# Patient Record
Sex: MALE | Race: BLACK OR AFRICAN AMERICAN | NOT HISPANIC OR LATINO | URBAN - METROPOLITAN AREA
[De-identification: names, ages, dates, MRNs, and addresses within clinical notes are randomized per-mention and may not be internally consistent; named-entity substitution may affect disease eponyms.]

---

## 2021-03-20 ENCOUNTER — EMERGENCY (EMERGENCY)
Facility: HOSPITAL | Age: 26
LOS: 1 days | Discharge: HOME | End: 2021-03-20
Attending: INTERNAL MEDICINE | Admitting: INTERNAL MEDICINE
Payer: COMMERCIAL

## 2021-03-20 VITALS
RESPIRATION RATE: 100 BRPM | TEMPERATURE: 98 F | WEIGHT: 184.97 LBS | HEART RATE: 65 BPM | OXYGEN SATURATION: 100 % | SYSTOLIC BLOOD PRESSURE: 117 MMHG | DIASTOLIC BLOOD PRESSURE: 71 MMHG | HEIGHT: 74 IN

## 2021-03-20 PROCEDURE — 99282 EMERGENCY DEPT VISIT SF MDM: CPT

## 2021-03-20 NOTE — ED PROVIDER NOTE - NSFOLLOWUPINSTRUCTIONS_ED_ALL_ED_FT
Rotator Cuff Injury    AMBULATORY CARE:    A rotator cuff injury is damage to the muscles or tendons of your rotator cuff. The rotator cuff is a group of muscles and tendons that hold the shoulder joint in place. The damage may include muscle stretching, tendon tears, or bursa inflammation. The bursa is a fluid sac around the joint.     Shoulder Anatomy         Common signs and symptoms:   •Pain that may be constant or come and go (such as only when you lie on the injured shoulder)      •Pain or stiffness in your shoulder that travels down your arm      •Trouble lifting your arm or placing it behind your back      •Trouble moving or using your shoulder      •A swollen shoulder that may be painful to the touch      •Numbness in part or all of your arm      •A popping noise along with pain when you lift your arm      Call your doctor or orthopedist if:   •You suddenly cannot move your arm.      •The pain in your shoulder or arm is not improving, or is worse than before you started treatment.      •You have new pain in your neck.      •You have questions or concerns about your condition or care.      Treatment may include any of the following:   •Medicines may be needed for pain or inflammation.      •A physical therapist can teach you exercises to help improve shoulder movement and strength, and decrease pain. You may learn changes to daily activities that will help decrease stress on your tendons.      •Surgery may be needed if your injury is severe or your symptoms do not improve. Surgery may be used to clean away damaged tissue or fix a tear. A piece of another tissue or muscle may be used to fix a badly torn tendon. The bone of your shoulder joint may be reshaped so it stays in place. An artificial joint made of metal and plastic may be put into your shoulder.      Care for your rotator cuff injury at home:   •Rest may help your shoulder heal. Overuse of your shoulder can make your injury worse. Avoid heavy lifting, putting your arms over your head, or sports that need an overhead or throwing motion. Any of these movements can cause or worsen a rotator cuff injury.      •Put ice on your shoulder every few hours for the first several days. Ice helps decrease pain and swelling. Use an ice pack, or put crushed ice in a plastic bag. Wrap a towel around the bag before you put it on your shoulder. Apply ice for 15 minutes every hour, or as directed.      •Put heat on your shoulder when directed. After the first several days, heat may help relax the muscles in your shoulder. Use a heat pack or heating pad. Apply heat for 20 minutes every hour, or as directed.      Follow up with your doctor or orthopedist as directed: Write down your questions so you remember to ask them during your visits.

## 2021-03-20 NOTE — ED PROVIDER NOTE - CHIEF COMPLAINT
Routine safety standards initiated.  Routine nursing standards initiated.   The patient is a 25y Male complaining of shoulder pain/injury.

## 2021-03-20 NOTE — ED PROVIDER NOTE - CLINICAL SUMMARY MEDICAL DECISION MAKING FREE TEXT BOX
shoulder injury already seen at  and had x-rays, he is here for a referral and off from work note for 4 days

## 2021-03-20 NOTE — ED PROVIDER NOTE - MUSCULOSKELETAL, MLM
Spine appears normal, range of motion is not limited, able to elevate above head, LUE  joint tenderness

## 2021-03-20 NOTE — ED PROVIDER NOTE - OBJECTIVE STATEMENT
25 yr old male c/o left shoulder pain since 3/12/21. Pt reports injury occurred at work. Was seen at urgent care and told he may have a torn ligament or rotator cuff and needs an outpatient MRI. Pt seeking outpatient MRI.  shoulder pain/injury 25 yr old male c/o left shoulder pain since 3/12/21. Pt reports injury occurred at work. Was seen at urgent care , xrays were negative, and told he may have a torn ligament or rotator cuff and needs an outpatient MRI. Pt seeking outpatient MRI. Also requesting off from work note   shoulder pain/injury

## 2021-03-20 NOTE — ED ADULT NURSE NOTE - NSIMPLEMENTINTERV_GEN_ALL_ED
Implemented All Fall Risk Interventions:  Hazard to call system. Call bell, personal items and telephone within reach. Instruct patient to call for assistance. Room bathroom lighting operational. Non-slip footwear when patient is off stretcher. Physically safe environment: no spills, clutter or unnecessary equipment. Stretcher in lowest position, wheels locked, appropriate side rails in place. Provide visual cue, wrist band, yellow gown, etc. Monitor gait and stability. Monitor for mental status changes and reorient to person, place, and time. Review medications for side effects contributing to fall risk. Reinforce activity limits and safety measures with patient and family.

## 2021-03-20 NOTE — ED ADULT TRIAGE NOTE - CHIEF COMPLAINT QUOTE
25 yr old male c/o left shoulder pain since 3/12/21. Pt reports injury occurred at work. Was seen at urgent care and told he may have a torn ligament or rotator cuff and needs an outpatient MRI. Pt seeking outpatient MRI.

## 2021-03-20 NOTE — ED PROVIDER NOTE - PATIENT PORTAL LINK FT
You can access the FollowMyHealth Patient Portal offered by [facility] by registering at the following website: [email or web address]. By joining [employer]’s FollowMyHealth portal, you will also be able to view your health information using other applications (apps) compatible with our system.

## 2021-03-20 NOTE — ED PROVIDER NOTE - CARE PROVIDER_API CALL
Duane Siddiqui  ORTHOPAEDIC SURGERY  [address] [place] [address]  Phone: [phone]  Fax: [phone]  Follow Up Time: 1-3 Days

## 2021-03-20 NOTE — ED ADULT NURSE NOTE - OBJECTIVE STATEMENT
received pt stable alert oriented x4 no distress sustained injury to shoulder at work last week and c/o of pain pt evaluated awaiting xray

## 2021-05-07 ENCOUNTER — EMERGENCY (EMERGENCY)
Facility: HOSPITAL | Age: 26
LOS: 1 days | Discharge: HOME | End: 2021-05-07
Attending: EMERGENCY MEDICINE | Admitting: EMERGENCY MEDICINE
Payer: COMMERCIAL

## 2021-05-07 VITALS
HEIGHT: 74 IN | DIASTOLIC BLOOD PRESSURE: 69 MMHG | WEIGHT: 195.11 LBS | RESPIRATION RATE: 16 BRPM | HEART RATE: 52 BPM | TEMPERATURE: 98 F | OXYGEN SATURATION: 99 % | SYSTOLIC BLOOD PRESSURE: 108 MMHG

## 2021-05-07 VITALS
TEMPERATURE: 98 F | HEART RATE: 58 BPM | RESPIRATION RATE: 16 BRPM | OXYGEN SATURATION: 100 % | SYSTOLIC BLOOD PRESSURE: 110 MMHG | DIASTOLIC BLOOD PRESSURE: 70 MMHG

## 2021-05-07 PROCEDURE — 99283 EMERGENCY DEPT VISIT LOW MDM: CPT

## 2021-05-07 PROCEDURE — 99284 EMERGENCY DEPT VISIT MOD MDM: CPT

## 2021-05-07 RX ORDER — METHOCARBAMOL 500 MG/1
2 TABLET, FILM COATED ORAL
Qty: 40 | Refills: 0
Start: 2021-05-07 | End: 2021-05-11

## 2021-05-07 RX ORDER — METHOCARBAMOL 500 MG/1
1500 TABLET, FILM COATED ORAL ONCE
Refills: 0 | Status: COMPLETED | OUTPATIENT
Start: 2021-05-07 | End: 2021-05-07

## 2021-05-07 RX ORDER — IBUPROFEN 200 MG
600 TABLET ORAL ONCE
Refills: 0 | Status: COMPLETED | OUTPATIENT
Start: 2021-05-07 | End: 2021-05-07

## 2021-05-07 RX ADMIN — METHOCARBAMOL 1500 MILLIGRAM(S): 500 TABLET, FILM COATED ORAL at 02:09

## 2021-05-07 RX ADMIN — Medication 600 MILLIGRAM(S): at 02:05

## 2021-05-07 NOTE — ED PROVIDER NOTE - PATIENT PORTAL LINK FT
You can access the FollowMyHealth Patient Portal offered by [facility] by registering at the following website: [email or web address]. By joining [employer]’s FollowMyHealth portal, you will also be able to view your health information using other applications (apps) compatible with our system.

## 2021-05-07 NOTE — ED PROVIDER NOTE - OBJECTIVE STATEMENT
26 yo male no pmhx c/o right lower back pain radiating down right leg, mild, no medications taken prior to arrival, sharp, worse with bending.  No fever/chills.  no urinary/bowel changes.

## 2021-05-07 NOTE — ED PROVIDER NOTE - CARE PROVIDER_API CALL
Ross Castillo)  Orthopaedic Surgery Surgery  [address] [place] [address]  Phone: [phone]  Fax: [phone]  Follow Up Time:

## 2021-05-07 NOTE — ED PROVIDER NOTE - PHYSICAL EXAMINATION
Gen: Alert, NAD  Head/eyes: NC/AT, PERR  ENT: airway patent  Neck: supple, no tenderness/meningismus/JVD, Trachea midline  Pulm/lung: Bilateral clear BS, normal resp effort, no wheeze/stridor/retractions  CV/heart: RRR, no M/R/G, +2 dist pulses (radial, pedal DP/PT, popliteal)  GI/Abd: soft, NT/ND, +BS, no guarding/rebound tenderness  Musculoskeletal: no edema/erythema/cyanosis, FROM in all extremities, no C/T/L spine ttp, +ttp right paraspinal lumbar  Skin: no rash, no vesicles, no petechaie, no ecchymosis, no swelling  Neuro: AAOx3, CN 2-12 intact, normal sensation, 5/5 motor strength in all extremities, normal gait, no dysmetria

## 2021-05-07 NOTE — ED PROVIDER NOTE - NSFOLLOWUPINSTRUCTIONS_ED_ALL_ED_FT
1) Follow-up with your Primary Medical Doctor and Dr. Castillo. Call today / next business day for prompt follow-up.  2) Return to Emergency room for any worsening or persistent pain, weakness, fever, or any other concerning symptoms.  3) See attached instruction sheets for additional information, including information regarding signs and symptoms to look out for, reasons to seek immediate care and other important instructions.  4) Follow-up with any specialists as discussed / noted as well.   5) medrol dose pack as prescribed  6) robaxin 1500mg every 6 hours as needed      WHAT YOU NEED TO KNOW:    What is lumbar radiculopathy? Lumbar radiculopathy is a painful condition that happens when a nerve in your lumbar spine (lower back) is pinched or irritated. Nerves control feeling and movement in your body.    What causes lumbar radiculopathy? You may get a pinched nerve in your lumbar spine if you have disc damage. Discs are natural, spongy cushions between your vertebrae (back bones) that allow your spine to move. Your discs may move out of place and pinch the nerve in your spine. Your risk for a pinched nerve and lumbar radiculopathy increases if:   •You smoke.      •You have diabetes, a spinal infection, or a growth in your spine.      •You are overweight.      •You are male.      •You are elderly.      What are the signs and symptoms of lumbar radiculopathy? You may have any of the following:  •Pain that moves from your lower back to your buttocks, groin, and the back of your leg. The pain is often felt below your knee. Your pain may worsen when you cough, sneeze, stand, or sit.       •Numbness, weakness, or tingling in your back or legs.      How is lumbar radiculopathy diagnosed? Your healthcare provider will examine you and ask about your family history of back and leg pain. He may also test you for weakness, numbness, or tingling in your back, buttocks, and legs. Your healthcare provider may ask you to lie on your back and lift your leg to locate your pain. You may have any of the following:   •Blood tests: You may need blood taken to give healthcare providers information about how your body is working. The blood may be taken from your hand, arm, or IV.       •Magnetic resonance imaging (MRI): An MRI machine is used to take a picture of your lower back. Your healthcare provider will use this picture to check for problems and changes in your back bones, nerves, and discs. You will need to lie still during this test. The MRI machine contains a very powerful magnet. Never enter the MRI room with any metal objects. This can cause serious injury. Tell your healthcare provider if you have any metal implants in your body.      •X-ray: An x-ray is a picture of your lower back. A lumbar x-ray may show signs of infection or other problems with your spine.      •An electromyography (EMG) test measures the electrical activity of your muscles at rest and with movement.      •Computed tomography (CT) scan: A special x-ray machine uses a computer to take pictures of your lower back. It may be used to look at your bones, discs, and nerves. You may be given dye in your IV to help improve the pictures. Tell your healthcare provider if you are allergic to shellfish, or have other allergies or medical conditions. People who are allergic to iodine or shellfish (lobster, crab, or shrimp) may be allergic to some dyes.      How is lumbar radiculopathy treated? Treatment of lumbar radiculopathy may reduce pain and swelling, and improve your ability to walk and do your normal activities. Ask your healthcare provider for more information about these and other treatments for lumbar radiculopathy:   •Medicines:?NSAIDs, such as ibuprofen, help decrease swelling, pain, and fever. This medicine is available with or without a doctor's order. NSAIDs can cause stomach bleeding or kidney problems in certain people. If you take blood thinner medicine, always ask your healthcare provider if NSAIDs are safe for you. Always read the medicine label and follow directions.      ?Muscle relaxers help decrease pain and muscle spasms.      ?Opioids: This is a strong medicine given to reduce severe pain. It is also called narcotic pain medicine. Take this medicine exactly as directed by your healthcare provider.      ?Oral steroids: Steroids may be given to reduce swelling and pain.      ?Steroid injections: Healthcare providers may give you steroid medicine through a needle (shot) into your lumbar spine. This may help decrease your nerve pain and swelling. You may need more than 1 injection if your symptoms do not improve after the first treatment.       •Physical therapy: Your healthcare provider may suggest physical therapy. Your physical therapist may teach you certain exercises to improve posture (the way you stand and sit), flexibility, and strength in your lower back. He may also teach you how to remain safely active and avoid further injury. Follow the exercise plan given to you by your physical therapist.      •Transcutaneous electrical nerve stimulation: This treatment, called TENS, stimulates your nerves and may decrease your pain. Wires are attached to pads. The pads are attached to your skin. The wires send a mild current through your nerves.       •Surgery: You may need surgery to relieve your pinched nerve if your condition has not improved within 4 to 6 weeks. You may also need it if you have lumbar radiculopathy more than once.      What are the risks of treatment for lumbar radiculopathy?   •Without treatment, your pain may worsen. The pinched and swollen nerve may lead to problems when you walk or move. In severe cases, you may lose control of your urine or bowel movements. Bedrest can make your symptoms worse.       •Pain medicines can cause vomiting, upset stomach, constipation (large, hard bowel movements that are difficult to pass), or kidney or liver problems. Opioid medicine may be addictive (hard to quit taking once you start). Oral steroids and steroid injections may have side effects, such as facial redness, fluid retention (water weight gain), and mood changes. Steroid injections may be painful and can cause severe headaches, infections, allergic reactions, or nerve damage. Surgery risks include delayed problems with healing, spinal or bladder infection, damage to the spinal cord or other nerves, and ongoing pain. In rare cases, you could become paralyzed (not able to move your arms or legs).      How can I care for myself when I have lumbar radiculopathy?   •Stay active: It is best to be active when you have lumbar radiculopathy. Your healthcare provider may tell you to take walks to ease yourself back into your daily routine. Avoid long periods of bed rest. Bed rest could worsen your symptoms. Do not move in ways that increase your pain. Ask your healthcare provider for more information about the best ways to stay active.       •Use ice or heat packs: Use ice or heat packs on the sore area of your body to decrease the pain and swelling. Put ice in a plastic bag covered with a towel on your low back. Cover heated items with a towel to avoid burns. Use ice and heat as directed.      •Avoid heavy lifting: Your condition may worsen if you lift heavy things. Avoid lifting if possible.      •Maintain a healthy weight: Excess body weight may strain your back. Talk with your healthcare provider about ways to lose excess weight if you are overweight.       When should I contact my healthcare provider?   •Your pain does not improve within 1 to 3 weeks after treatment.       •Your pain and weakness keep you from your normal activities at work, home, or school.       •You lose more than 10 pounds in 6 months without trying.      •You become depressed or sad because of the pain.      •You have questions or concerns about your condition or care.      When should I seek immediate care or call 911?   •You have a fever greater than 100.4°F for longer than 2 days.      •You have new, severe back or leg pain, or your pain spreads to both legs.      •You have any new signs of numbness or weakness, especially in your lower back, legs, arms, or genital area.      •You have new trouble controlling your urine and bowel movements.      •You do not feel like your bladder empties when you urinate.       CARE AGREEMENT:    You have the right to help plan your care. Learn about your health condition and how it may be treated. Discuss treatment options with your healthcare providers to decide what care you want to receive. You always have the right to refuse treatment.

## 2021-05-07 NOTE — ED ADULT NURSE NOTE - CHPI ED NUR SYMPTOMS NEG
no abrasion/no bruising/no deformity/no difficulty bearing weight/no fever/no numbness/no stiffness/no tingling/no weakness

## 2021-09-03 ENCOUNTER — INPATIENT (INPATIENT)
Facility: HOSPITAL | Age: 26
LOS: 4 days | Discharge: HOME | DRG: 813 | End: 2021-09-08
Attending: HOSPITALIST | Admitting: GENERAL PRACTICE
Payer: COMMERCIAL

## 2021-09-03 VITALS
HEART RATE: 56 BPM | OXYGEN SATURATION: 100 % | HEIGHT: 74 IN | TEMPERATURE: 98 F | SYSTOLIC BLOOD PRESSURE: 125 MMHG | RESPIRATION RATE: 18 BRPM | WEIGHT: 184.97 LBS | DIASTOLIC BLOOD PRESSURE: 64 MMHG

## 2021-09-03 DIAGNOSIS — D69.3 IMMUNE THROMBOCYTOPENIC PURPURA: ICD-10-CM

## 2021-09-03 DIAGNOSIS — Z29.9 ENCOUNTER FOR PROPHYLACTIC MEASURES, UNSPECIFIED: ICD-10-CM

## 2021-09-03 LAB
ALBUMIN SERPL ELPH-MCNC: 3.7 G/DL — SIGNIFICANT CHANGE UP (ref 3.3–5)
ALP SERPL-CCNC: 47 U/L — SIGNIFICANT CHANGE UP (ref 40–120)
ALT FLD-CCNC: 26 U/L — SIGNIFICANT CHANGE UP (ref 12–78)
ANION GAP SERPL CALC-SCNC: 5 MMOL/L — SIGNIFICANT CHANGE UP (ref 5–17)
APPEARANCE UR: ABNORMAL
APTT BLD: 32.8 SEC — SIGNIFICANT CHANGE UP (ref 27.5–35.5)
AST SERPL-CCNC: 24 U/L — SIGNIFICANT CHANGE UP (ref 15–37)
BASOPHILS # BLD AUTO: 0.02 K/UL — SIGNIFICANT CHANGE UP (ref 0–0.2)
BASOPHILS NFR BLD AUTO: 0.3 % — SIGNIFICANT CHANGE UP (ref 0–2)
BILIRUB SERPL-MCNC: 0.5 MG/DL — SIGNIFICANT CHANGE UP (ref 0.2–1.2)
BILIRUB UR-MCNC: NEGATIVE — SIGNIFICANT CHANGE UP
BUN SERPL-MCNC: 15 MG/DL — SIGNIFICANT CHANGE UP (ref 7–23)
CALCIUM SERPL-MCNC: 8.7 MG/DL — SIGNIFICANT CHANGE UP (ref 8.5–10.1)
CHLORIDE SERPL-SCNC: 107 MMOL/L — SIGNIFICANT CHANGE UP (ref 96–108)
CO2 SERPL-SCNC: 29 MMOL/L — SIGNIFICANT CHANGE UP (ref 22–31)
COLOR SPEC: ABNORMAL
CREAT SERPL-MCNC: 1 MG/DL — SIGNIFICANT CHANGE UP (ref 0.5–1.3)
DIFF PNL FLD: ABNORMAL
EOSINOPHIL # BLD AUTO: 0.11 K/UL — SIGNIFICANT CHANGE UP (ref 0–0.5)
EOSINOPHIL NFR BLD AUTO: 1.8 % — SIGNIFICANT CHANGE UP (ref 0–6)
GLUCOSE SERPL-MCNC: 90 MG/DL — SIGNIFICANT CHANGE UP (ref 70–99)
GLUCOSE UR QL: NEGATIVE — SIGNIFICANT CHANGE UP
HCT VFR BLD CALC: 35.8 % — LOW (ref 39–50)
HGB BLD-MCNC: 12.3 G/DL — LOW (ref 13–17)
IMM GRANULOCYTES NFR BLD AUTO: 1 % — SIGNIFICANT CHANGE UP (ref 0–1.5)
INR BLD: 1.12 RATIO — SIGNIFICANT CHANGE UP (ref 0.88–1.16)
KETONES UR-MCNC: ABNORMAL
LEUKOCYTE ESTERASE UR-ACNC: ABNORMAL
LYMPHOCYTES # BLD AUTO: 1.52 K/UL — SIGNIFICANT CHANGE UP (ref 1–3.3)
LYMPHOCYTES # BLD AUTO: 25.3 % — SIGNIFICANT CHANGE UP (ref 13–44)
MCHC RBC-ENTMCNC: 29.3 PG — SIGNIFICANT CHANGE UP (ref 27–34)
MCHC RBC-ENTMCNC: 34.4 GM/DL — SIGNIFICANT CHANGE UP (ref 32–36)
MCV RBC AUTO: 85.2 FL — SIGNIFICANT CHANGE UP (ref 80–100)
MONOCYTES # BLD AUTO: 0.63 K/UL — SIGNIFICANT CHANGE UP (ref 0–0.9)
MONOCYTES NFR BLD AUTO: 10.5 % — SIGNIFICANT CHANGE UP (ref 2–14)
NEUTROPHILS # BLD AUTO: 3.66 K/UL — SIGNIFICANT CHANGE UP (ref 1.8–7.4)
NEUTROPHILS NFR BLD AUTO: 61.1 % — SIGNIFICANT CHANGE UP (ref 43–77)
NITRITE UR-MCNC: NEGATIVE — SIGNIFICANT CHANGE UP
NRBC # BLD: 0 /100 WBCS — SIGNIFICANT CHANGE UP (ref 0–0)
PH UR: 6.5 — SIGNIFICANT CHANGE UP (ref 5–8)
PLATELET # BLD AUTO: 2 K/UL — CRITICAL LOW (ref 150–400)
POTASSIUM SERPL-MCNC: 3.9 MMOL/L — SIGNIFICANT CHANGE UP (ref 3.5–5.3)
POTASSIUM SERPL-SCNC: 3.9 MMOL/L — SIGNIFICANT CHANGE UP (ref 3.5–5.3)
PROT SERPL-MCNC: 6.7 G/DL — SIGNIFICANT CHANGE UP (ref 6–8.3)
PROT UR-MCNC: 500 MG/DL
PROTHROM AB SERPL-ACNC: 13 SEC — SIGNIFICANT CHANGE UP (ref 10.6–13.6)
RBC # BLD: 4.2 M/UL — SIGNIFICANT CHANGE UP (ref 4.2–5.8)
RBC # FLD: 12.5 % — SIGNIFICANT CHANGE UP (ref 10.3–14.5)
SARS-COV-2 RNA SPEC QL NAA+PROBE: SIGNIFICANT CHANGE UP
SODIUM SERPL-SCNC: 141 MMOL/L — SIGNIFICANT CHANGE UP (ref 135–145)
SP GR SPEC: 1.02 — SIGNIFICANT CHANGE UP (ref 1.01–1.02)
UROBILINOGEN FLD QL: NEGATIVE — SIGNIFICANT CHANGE UP
WBC # BLD: 6 K/UL — SIGNIFICANT CHANGE UP (ref 3.8–10.5)
WBC # FLD AUTO: 6 K/UL — SIGNIFICANT CHANGE UP (ref 3.8–10.5)

## 2021-09-03 PROCEDURE — 99222 1ST HOSP IP/OBS MODERATE 55: CPT | Mod: GC

## 2021-09-03 PROCEDURE — 99285 EMERGENCY DEPT VISIT HI MDM: CPT

## 2021-09-03 PROCEDURE — 93010 ELECTROCARDIOGRAM REPORT: CPT

## 2021-09-03 PROCEDURE — 74176 CT ABD & PELVIS W/O CONTRAST: CPT | Mod: 26,MA

## 2021-09-03 RX ORDER — DEXAMETHASONE 0.5 MG/5ML
40 ELIXIR ORAL ONCE
Refills: 0 | Status: COMPLETED | OUTPATIENT
Start: 2021-09-03 | End: 2021-09-03

## 2021-09-03 RX ORDER — DEXAMETHASONE 0.5 MG/5ML
40 ELIXIR ORAL DAILY
Refills: 0 | Status: COMPLETED | OUTPATIENT
Start: 2021-09-04 | End: 2021-09-06

## 2021-09-03 RX ORDER — ACETAMINOPHEN 500 MG
650 TABLET ORAL EVERY 6 HOURS
Refills: 0 | Status: DISCONTINUED | OUTPATIENT
Start: 2021-09-03 | End: 2021-09-08

## 2021-09-03 RX ORDER — SODIUM CHLORIDE 9 MG/ML
1000 INJECTION INTRAMUSCULAR; INTRAVENOUS; SUBCUTANEOUS ONCE
Refills: 0 | Status: COMPLETED | OUTPATIENT
Start: 2021-09-03 | End: 2021-09-03

## 2021-09-03 RX ADMIN — SODIUM CHLORIDE 1000 MILLILITER(S): 9 INJECTION INTRAMUSCULAR; INTRAVENOUS; SUBCUTANEOUS at 20:14

## 2021-09-03 RX ADMIN — SODIUM CHLORIDE 1000 MILLILITER(S): 9 INJECTION INTRAMUSCULAR; INTRAVENOUS; SUBCUTANEOUS at 15:59

## 2021-09-03 RX ADMIN — Medication 120 MILLIGRAM(S): at 18:21

## 2021-09-03 NOTE — H&P ADULT - NSHPSOCIALHISTORY_GEN_ALL_CORE
Tobacco: denies  EtOH: drinks 2-3 cups vodka socially  Recreational drug use: smokes 3-5 joints of marijuana daily  Lives with: mom and sister  Ambulates: independent  ADLs: independent

## 2021-09-03 NOTE — ED PROVIDER NOTE - ATTENDING CONTRIBUTION TO CARE
I have personally performed a face to face diagnostic evaluation on this patient.  I have reviewed the PA note and agree with the history, exam, and plan of care, except as noted.  History and Exam by me shows  right flank pain intermittently x 1 years, recurs today c hematuria and petechial tongue today.   pt is in nad.  no ho recent illness. pt is in nad.  a n o x 3.  head nc/at.  tongue-petechia.   back- nt, no lesion, no swelling.  lab sig for platelet 2.  ct was unremarkable. I have personally performed a face to face diagnostic evaluation on this patient.  I have reviewed the PA note and agree with the history, exam, and plan of care, except as noted.  History and Exam by me shows  right flank pain intermittently x 1 years, recurs today c hematuria and petechial tongue today.   pt is in nad.  no ho recent illness. pt is in nad.  a n o x 3.  head nc/at.  tongue-petechia.   back- nt, no lesion, no swelling.  abd- soft, nt, no guarding, no rebound, no distension.  lab sig for platelet 2.  ct was unremarkable.

## 2021-09-03 NOTE — ED PROVIDER NOTE - CLINICAL SUMMARY MEDICAL DECISION MAKING FREE TEXT BOX
Pt is a 24 yo male with blood in urine will get labs ct [place] which was negative platelet 2 spoke with heme advised decadron 40 mg and admission hgb stable

## 2021-09-03 NOTE — H&P ADULT - NSHPADDITIONALINFOADULT_GEN_ALL_CORE
Attending attestation   I have personally seen and examined the patient. I fully participate in the care of the patient. I have made amendments to the documentation where necessary and agree with the history, physical exam and plan as documented by the medical resident.     24 y/o with no PMH who presents to the ED for evaluation of right flank pain. Pain started two days ago. He started to have hematuria this afternoon. He also reports gingival bleeding and easy bruising around his tongue. + epistaxis.   Patient denies any recent travel. Denies any recent illness. denies any recent medications.   denies any family history of hypercoagulable diseases.     PE:   + petechia on tongue   + right flank pain   no pedal edema.     A/P:  1. ITP    - admit     - hematology consult appreciated. will cont decadron 40mg IV daily for three days.     - monitor plt counts. no transfusion at this time.     - fall precautions. d/w patient to ambulate carefully due to risk of bleeding.     2. Right flank pain/hematuria      - CT with small hyperdensity in the right renal collecting system. suspect clot given hematuria.     - cont to monitor. if pain is persistent, consider urology eval or reimaging.     DVT proph: SCDs    Janie Henderson D.O.

## 2021-09-03 NOTE — H&P ADULT - HISTORY OF PRESENT ILLNESS
25 year old M w no significant PMH presents to the ED from home c/o R flank pain and blood in urine since this morning (9/3). Patient endorses 2-3 days of R flank pain rated 4/10 in severity, gingival bleeding after brushing his teeth, epistaxis, easy bruising with no inciting trauma. He admits to associated recent shortness of breath and fatigue. Patient endorses seeing blood in his urine this morning, which prompted him to come to the ED. He denies burning on urination or increased urinary frequency. Patient denies any previous episodes of bleeding/easy bruising or any family history of bleeding disorders. Denies headache, dizziness, nausea, vomiting, blood in stool.     ED course:  Vitals: T 98.1, HR 56, /64, RR 18, SpO2 100% on RA  Labs: H/H 12.3/35.8, platelets 2  Urinalysis: red, turbid, trace ketones, protein 500, trace LE, large blood, >50 RBCs, moderate bacteria  CT a/p: Subtle asymmetric hyperdensity of the right renal collecting system, indeterminate  Given: Decadon 40mg IVPB x1, 1L IV NS x1

## 2021-09-03 NOTE — H&P ADULT - ATTENDING COMMENTS
24 y/o with no PMH who presents to the ED for evaluation of right flank pain. Pain started two days ago. He started to have hematuria this afternoon. He also reports gingival bleeding and easy bruising around his tongue. + epistaxis.   Patient denies any recent travel. Denies any recent illness. denies any recent medications.   denies any family history of hypercoagulable diseases.     PE:   + petechia on tongue   + right flank pain   no pedal edema.     A/P:  1. ITP    - admit     - hematology consult appreciated. will cont decadron 40mg IV daily for three days.     - monitor plt counts. no transfusion at this time.     - fall precautions. d/w patient to ambulate carefully due to risk of bleeding.     2. Right flank pain/hematuria      - CT with small hyperdensity in the right renal collecting system. suspect clot given hematuria.     - cont to monitor. if pain is persistent, consider urology eval or reimaging.     DVT proph: SCDs

## 2021-09-03 NOTE — ED ADULT NURSE NOTE - NS TRANSFER PATIENT BELONGINGS
Cell Phone/PDA (specify)/Clothing lab top and playstation, wallet, 2 earring and necklace/Cell Phone/PDA (specify)/Electronic Device (specify)/Clothing

## 2021-09-03 NOTE — H&P ADULT - NSHPPHYSICALEXAM_GEN_ALL_CORE
LOS:     VITALS:   T(C): 36.7 (09-03-21 @ 15:02), Max: 36.7 (09-03-21 @ 15:02)  HR: 56 (09-03-21 @ 15:02) (56 - 56)  BP: 125/64 (09-03-21 @ 15:02) (125/64 - 125/64)  RR: 18 (09-03-21 @ 15:02) (18 - 18)  SpO2: 100% (09-03-21 @ 15:02) (100% - 100%)    GENERAL: NAD, sitting in bed comfortably  HEAD:  Atraumatic, Normocephalic  EYES: EOMI, PERRLA, conjunctiva and sclera clear  ENT: purpura inside mouth and on tongue  CHEST/LUNG: Clear to auscultation bilaterally; No rales, rhonchi, wheezing, or rubs. Unlabored respirations  HEART: Regular rate and rhythm; No murmurs, rubs, or gallops  ABDOMEN: BSx4; Soft, nontender, nondistended  NERVOUS SYSTEM:  A&Ox3, no focal deficits   SKIN: petechiae on fingertips and feet b/l; bruise on L forearm and chest  MUSCULOSKELETAL: tenderness to percussion of R flank

## 2021-09-03 NOTE — H&P ADULT - PROBLEM SELECTOR PLAN 1
- CT a/p: Subtle asymmetric hyperdensity of the right renal collecting system, indeterminate  - Given Decadon 40mg IVPB x1, 1L IV NS x1 in ED  - H/H 12.3/35.8 on admission, baseline hemoglobin unknown  - Platelets 2 on admission, baseline unknown  - F/u iron panel: iron, ferritin, TIBC, transferrin  - F/u folate, vitamin B12, TSH  - F/u AM CBC  - Trend coagulation factors  - Hematology (Dr. Oakes) consulted, follow up recommendations - CT a/p: Subtle asymmetric hyperdensity of the right renal collecting system, indeterminate  - Given Decadon 40mg IVPB x1, 1L IV NS x1 in ED (9/3)  - Continue Decadron 40mg qd x 3 days   - H/H 12.3/35.8 on admission, baseline hemoglobin unknown  - Platelets 2 on admission, baseline unknown  - Trend coags  - Hematology (Dr. Oakes) consulted, follow up recommendations

## 2021-09-03 NOTE — H&P ADULT - NSHPREVIEWOFSYSTEMS_GEN_ALL_CORE
General: admits to fatigue; no fever, chills, weight gain or weight loss, changes in appetite  HEENT: no nasal congestion, cough, rhinorrhea, sore throat, headache, changes in vision  Cardio: no palpitations, pallor, chest pain or discomfort  Pulm: mild shortness of breath on exertion  GI: no vomiting, diarrhea, abdominal pain, constipation   /Renal: no dysuria, foul smelling urine, increased frequency, flank pain  MSK: R flank pain rated 4/10; no edema, joint pain or swelling, gait changes  Heme: bruise on L forearm and chest with no inciting injury; blood in urine since this morning; epistaxis, bleeding tongue, gingival bleeding for 2-3 days.

## 2021-09-03 NOTE — ED PROVIDER NOTE - CHPI ED SYMPTOMS NEG
Problem: Thermoregulation  Goal: Body temperature maintained within normal range  Infant regulating temperature on Air Servo with set temp of 28.4 degrees celsius. Swaddled. Temp remains stable. Will continue to monitor.        no vomiting

## 2021-09-03 NOTE — ED ADULT NURSE NOTE - NSIMPLEMENTINTERV_GEN_ALL_ED
Implemented All Universal Safety Interventions:  [place] to call system. Call bell, personal items and telephone within reach. Instruct patient to call for assistance. Room bathroom lighting operational. Non-slip footwear when patient is off stretcher. Physically safe environment: no spills, clutter or unnecessary equipment. Stretcher in lowest position, wheels locked, appropriate side rails in place.

## 2021-09-03 NOTE — ED ADULT NURSE NOTE - NSICDXNOPASTSURGICALHX_GEN_ALL_CORE
<-- Click to add NO significant Past Surgical History
decreased strength/impaired postural control/impaired balance

## 2021-09-03 NOTE — ED PROVIDER NOTE - OBJECTIVE STATEMENT
Pt is a 24 yo male with no pmhx c/o of right flank pain since this morning and noted blood in urine denies any nvd fever chills. Pt also c/o of intermittent nose bleeds and tongue lesions which bleed for past 2 days.     pcp none

## 2021-09-03 NOTE — ED PROVIDER NOTE - ENMT, MLM
Airway patent, Nasal mucosa clear. Mouth with normal mucosa. Throat has no vesicles, no oropharyngeal exudates and uvula is midline. tongue with bleeding friable lesions

## 2021-09-04 LAB
ALBUMIN SERPL ELPH-MCNC: 3.8 G/DL — SIGNIFICANT CHANGE UP (ref 3.3–5)
ALP SERPL-CCNC: 50 U/L — SIGNIFICANT CHANGE UP (ref 40–120)
ALT FLD-CCNC: 28 U/L — SIGNIFICANT CHANGE UP (ref 12–78)
ANION GAP SERPL CALC-SCNC: 7 MMOL/L — SIGNIFICANT CHANGE UP (ref 5–17)
APTT BLD: 30.3 SEC — SIGNIFICANT CHANGE UP (ref 27.5–35.5)
AST SERPL-CCNC: 18 U/L — SIGNIFICANT CHANGE UP (ref 15–37)
BASOPHILS # BLD AUTO: 0.01 K/UL — SIGNIFICANT CHANGE UP (ref 0–0.2)
BASOPHILS NFR BLD AUTO: 0.1 % — SIGNIFICANT CHANGE UP (ref 0–2)
BILIRUB SERPL-MCNC: 0.6 MG/DL — SIGNIFICANT CHANGE UP (ref 0.2–1.2)
BUN SERPL-MCNC: 13 MG/DL — SIGNIFICANT CHANGE UP (ref 7–23)
CALCIUM SERPL-MCNC: 9.4 MG/DL — SIGNIFICANT CHANGE UP (ref 8.5–10.1)
CHLORIDE SERPL-SCNC: 108 MMOL/L — SIGNIFICANT CHANGE UP (ref 96–108)
CO2 SERPL-SCNC: 26 MMOL/L — SIGNIFICANT CHANGE UP (ref 22–31)
COVID-19 SPIKE DOMAIN AB INTERP: NEGATIVE — SIGNIFICANT CHANGE UP
COVID-19 SPIKE DOMAIN ANTIBODY RESULT: 0.4 U/ML — SIGNIFICANT CHANGE UP
CREAT SERPL-MCNC: 0.77 MG/DL — SIGNIFICANT CHANGE UP (ref 0.5–1.3)
EOSINOPHIL # BLD AUTO: 0 K/UL — SIGNIFICANT CHANGE UP (ref 0–0.5)
EOSINOPHIL NFR BLD AUTO: 0 % — SIGNIFICANT CHANGE UP (ref 0–6)
GLUCOSE SERPL-MCNC: 133 MG/DL — HIGH (ref 70–99)
HCT VFR BLD CALC: 36.4 % — LOW (ref 39–50)
HGB BLD-MCNC: 12.8 G/DL — LOW (ref 13–17)
IMM GRANULOCYTES NFR BLD AUTO: 1 % — SIGNIFICANT CHANGE UP (ref 0–1.5)
INR BLD: 1.15 RATIO — SIGNIFICANT CHANGE UP (ref 0.88–1.16)
LYMPHOCYTES # BLD AUTO: 1 K/UL — SIGNIFICANT CHANGE UP (ref 1–3.3)
LYMPHOCYTES # BLD AUTO: 14.3 % — SIGNIFICANT CHANGE UP (ref 13–44)
MCHC RBC-ENTMCNC: 29.4 PG — SIGNIFICANT CHANGE UP (ref 27–34)
MCHC RBC-ENTMCNC: 35.2 GM/DL — SIGNIFICANT CHANGE UP (ref 32–36)
MCV RBC AUTO: 83.5 FL — SIGNIFICANT CHANGE UP (ref 80–100)
MONOCYTES # BLD AUTO: 0.23 K/UL — SIGNIFICANT CHANGE UP (ref 0–0.9)
MONOCYTES NFR BLD AUTO: 3.3 % — SIGNIFICANT CHANGE UP (ref 2–14)
NEUTROPHILS # BLD AUTO: 5.69 K/UL — SIGNIFICANT CHANGE UP (ref 1.8–7.4)
NEUTROPHILS NFR BLD AUTO: 81.3 % — HIGH (ref 43–77)
NRBC # BLD: 0 /100 WBCS — SIGNIFICANT CHANGE UP (ref 0–0)
PLATELET # BLD AUTO: 0 K/UL — CRITICAL LOW (ref 150–400)
POTASSIUM SERPL-MCNC: 3.8 MMOL/L — SIGNIFICANT CHANGE UP (ref 3.5–5.3)
POTASSIUM SERPL-SCNC: 3.8 MMOL/L — SIGNIFICANT CHANGE UP (ref 3.5–5.3)
PROT SERPL-MCNC: 7.3 G/DL — SIGNIFICANT CHANGE UP (ref 6–8.3)
PROTHROM AB SERPL-ACNC: 13.4 SEC — SIGNIFICANT CHANGE UP (ref 10.6–13.6)
RBC # BLD: 4.36 M/UL — SIGNIFICANT CHANGE UP (ref 4.2–5.8)
RBC # FLD: 12.1 % — SIGNIFICANT CHANGE UP (ref 10.3–14.5)
SARS-COV-2 IGG+IGM SERPL QL IA: 0.4 U/ML — SIGNIFICANT CHANGE UP
SARS-COV-2 IGG+IGM SERPL QL IA: NEGATIVE — SIGNIFICANT CHANGE UP
SODIUM SERPL-SCNC: 141 MMOL/L — SIGNIFICANT CHANGE UP (ref 135–145)
WBC # BLD: 7 K/UL — SIGNIFICANT CHANGE UP (ref 3.8–10.5)
WBC # FLD AUTO: 7 K/UL — SIGNIFICANT CHANGE UP (ref 3.8–10.5)

## 2021-09-04 PROCEDURE — 99232 SBSQ HOSP IP/OBS MODERATE 35: CPT | Mod: GC

## 2021-09-04 RX ORDER — INFLUENZA VIRUS VACCINE 15; 15; 15; 15 UG/.5ML; UG/.5ML; UG/.5ML; UG/.5ML
0.5 SUSPENSION INTRAMUSCULAR ONCE
Refills: 0 | Status: DISCONTINUED | OUTPATIENT
Start: 2021-09-04 | End: 2021-09-08

## 2021-09-04 RX ADMIN — Medication 120 MILLIGRAM(S): at 06:59

## 2021-09-04 NOTE — PROGRESS NOTE ADULT - PROBLEM SELECTOR PLAN 1
- CT a/p: Subtle asymmetric hyperdensity of the right renal collecting system, indeterminate  - Given Decadon 40mg IVPB x1, 1L IV NS x1 in ED (9/3)  - Continue Decadron 40mg qd x 3 days   - H/H 12.3/35.8 on admission, baseline hemoglobin unknown  - Platelets 2 on admission, baseline unknown  - Trend coags  if platelet not imporinv by manuela rodriguez start on immunogloblun

## 2021-09-04 NOTE — CONSULT NOTE ADULT - ASSESSMENT
24 y/o man w acute right flank pain and gross hematuria, also black/petechial lesions on tongue without active bleeds,  found w isolated thrombocytopenia, 2k.  No recent illness/infection/vaccination/new meds, did start new brand of recreational smoke last week.  Started on IV decadron 40mg x4 days  Review of kiran blood morphology confirms lack of platelets, no other atypia    -most c/w ITP, immune thrombocytopenic purpura  -bleeding decr, tongue lesions healing, plts still similarly low but WBC/Hgb remains preserved  -no active mucosal bleeds at this time and clinically better, to continue IV Decadron 40mg qD for total 4 days  -if any mucosal bleeds(hemorrhagic mucosal blisters, gross hematuria) then will start IVIG 1gm/kg x2 days  -any sig gross bleed then platelets transfusion  -follow daily CBC, if plts not increased by tomorrow will start the IVIG  -d/c pneumonic stockings, pt at low risk of thrombosis w plts low, and the device may cause petechiae in region due to compression  -discussed findings, signs and symptoms, sig, treatment plans and reasons w pt and mother over phone    discussed w Medicine and nursing staff  thank you, will follow w you

## 2021-09-04 NOTE — PROGRESS NOTE ADULT - SUBJECTIVE AND OBJECTIVE BOX
Patient is a 25y old  Male who presents with a chief complaint of Acute ITP (03 Sep 2021 18:52)        HPI:  25 year old M w no significant PMH presents to the ED from home c/o R flank pain and blood in urine since this morning (9/3). Patient endorses 2-3 days of R flank pain rated 4/10 in severity, gingival bleeding after brushing his teeth, epistaxis, easy bruising with no inciting trauma. He admits to associated recent shortness of breath and fatigue. Patient endorses seeing blood in his urine this morning, which prompted him to come to the ED. He denies burning on urination or increased urinary frequency. Patient denies any previous episodes of bleeding/easy bruising or any family history of bleeding disorders. Denies headache, dizziness, nausea, vomiting, blood in stool.     ED course:  Vitals: T 98.1, HR 56, /64, RR 18, SpO2 100% on RA  Labs: H/H 12.3/35.8, platelets 2  Urinalysis: red, turbid, trace ketones, protein 500, trace LE, large blood, >50 RBCs, moderate bacteria  CT a/p: Subtle asymmetric hyperdensity of the right renal collecting system, indeterminate  Given: Decadon 40mg IVPB x1, 1L IV NS x1   (03 Sep 2021 18:52)      SUBJECTIVE & OBJECTIVE: Pt seen and examined at bedside. no bleedng noted     PHYSICAL EXAM:  T(C): 36.7 (21 @ 05:52), Max: 36.9 (21 @ 21:15)  HR: 53 (21 @ 05:52) (53 - 85)  BP: 112/65 (21 @ 05:52) (112/65 - 126/72)  RR: 16 (21 @ 05:52) (16 - 19)  SpO2: 95% (21 @ 05:52) (95% - 100%)  Wt(kg): -- Height (cm): 188 ( @ 15:02)  Weight (kg): 83.9 ( @ 02:30)  BMI (kg/m2): 23.7 ( 02:30)  BSA (m2): 2.1 (09-04 @ 02:30)  GENERAL: NAD, well-groomed, well-developed  HEAD:  Atraumatic, NormocephalicNECK: Supple, No JVD  NERVOUS SYSTEM:  Alert & Oriented X3, Motor Strength 5/5 B/L upper and lower extremities; DTRs 2+ intact and symmetric  CHEST/LUNG: Clear to auscultation bilaterally; No rales, rhonchi, wheezing, or rubs  HEART: Regular rate and rhythm; No murmurs, rubs, or gallops  ABDOMEN: Soft, Nontender, Nondistended; Bowel sounds present  EXTREMITIES:  2+ Peripheral Pulses, No clubbing, cyanosis, or edema        MEDICATIONS  (STANDING):  dexAMETHasone  IVPB 40 milliGRAM(s) IV Intermittent daily  influenza   Vaccine 0.5 milliLiter(s) IntraMuscular once    MEDICATIONS  (PRN):  acetaminophen   Tablet .. 650 milliGRAM(s) Oral every 6 hours PRN Moderate Pain (4 - 6)      LABS:                        12.8   7.00  )-----------( x        ( 04 Sep 2021 08:37 )             36.4     09-04    141  |  108  |  13  ----------------------------<  133<H>  3.8   |  26  |  0.77    Ca    9.4      04 Sep 2021 08:37    TPro  7.3  /  Alb  3.8  /  TBili  0.6  /  DBili  x   /  AST  18  /  ALT  28  /  AlkPhos  50  09-04    PT/INR - ( 04 Sep 2021 08:37 )   PT: 13.4 sec;   INR: 1.15 ratio         PTT - ( 04 Sep 2021 08:37 )  PTT:30.3 sec  Urinalysis Basic - ( 03 Sep 2021 17:46 )    Color: Red / Appearance: Turbid / S.020 / pH: x  Gluc: x / Ketone: Trace  / Bili: Negative / Urobili: Negative   Blood: x / Protein: 500 mg/dL / Nitrite: Negative   Leuk Esterase: Trace / RBC: >50 /HPF / WBC 3-5   Sq Epi: x / Non Sq Epi: Few / Bacteria: Moderate        CAPILLARY BLOOD GLUCOSE          CAPILLARY BLOOD GLUCOSE        CAPILLARY BLOOD GLUCOSE                RECENT CULTURES:      RADIOLOGY & ADDITIONAL TESTS:                        DVT/GI ppx  Discussed with pt @ bedside

## 2021-09-04 NOTE — CONSULT NOTE ADULT - SUBJECTIVE AND OBJECTIVE BOX
All records reviewed.    HPI:  24 y/o man w no sig PMH,  adm through ER 9/3/21 w acute onset right flank pain and gross hematuria. Also noted bleeding blisters on tongue. no sig skin changes or bleeds elsewhere, CBC w isolated platelets 2k  Pt reports played basketball w friends one week ago and no incr bleed/bruise  No recent illness/vaccine/new meds within the month  Did start diff brand of recreational smoke last week.  No fever, malaise, anorexia weight loss.  ED course:  Vitals: T 98.1, HR 56, /64, RR 18, SpO2 100% on RA  Labs: H/H 12.3/35.8, platelets 2  Urinalysis: red, turbid, trace ketones, protein 500, trace LE, large blood, >50 RBCs, moderate bacteria  CT a/p: Subtle asymmetric hyperdensity of the right renal collecting system, indeterminate  After discussion, started on Decadron 40mg IV, plan for 4 days  Today reports urine much improved, only pinkish now by last urine 6am. No new sites of bleed, tongue lesions also healing      PAST MEDICAL & SURGICAL HISTORY:  No pertinent past medical history    No significant past surgical history        Review of System:  see above, no blood in stool no melena, right flank pain resolved    MEDICATIONS  (STANDING):  dexAMETHasone  IVPB 40 milliGRAM(s) IV Intermittent daily  influenza   Vaccine 0.5 milliLiter(s) IntraMuscular once    MEDICATIONS  (PRN):  acetaminophen   Tablet .. 650 milliGRAM(s) Oral every 6 hours PRN Moderate Pain (4 - 6)      Allergies    No Known Allergies    Intolerances        SOCIAL HISTORY:  recreational smoke  social Eto[place]    FAMILY HISTORY:  FH: HTN (hypertension) (Father)    FH: type 2 diabetes (Father)    no blood dyscrasia        Vital Signs Last 24 Hrs  T(C): 36.7 (04 Sep 2021 05:52), Max: 36.9 (03 Sep 2021 21:15)  T(F): 98.1 (04 Sep 2021 05:52), Max: 98.4 (03 Sep 2021 21:15)  HR: 53 (04 Sep 2021 05:52) (53 - 85)  BP: 112/65 (04 Sep 2021 05:52) (112/65 - 126/72)  BP(mean): --  RR: 16 (04 Sep 2021 05:52) (16 - 19)  SpO2: 95% (04 Sep 2021 05:52) (95% - 100%)    PHYSICAL EXAM:      General:well developed well nourished, in no acute distress  Eyes:sclera anicteric, pupils equal and reactive to light  ENMT:buccal mucosa moist, pharynx not injected. Crusted healing old hemorr lesions top of tongue, no longer on edges  Neck:supple, trachea midline  Lungs:clear, no wheeze/rhonchi  Cardiovascular:regular rate and rhythm, S1 S2  Abdomen:soft, nontender, no organomegaly present, bowel sounds normal  Neurological:alert and oriented x3, Cranial Nerves II-XII grossly intact  Skin:no increased ecchymosis/petechiae/purpura  Lymph Nodes:no palpable cervical/supraclavicular lymph nodes enlargements  Extremities:no cyanosis/clubbing/edema/martha pneumonic stockings        LABS:                        12.8   7.00  )-----------( x        ( 04 Sep 2021 08:37 )             36.4      @ 08:37  WBC7.00  RBC4.36 Hgb12.8 Hct36.4  MCV83.5  Plts--  Auto Neutro-- Band-- Auto Lymph-- Atypical Lymph-- Reactive Lymph-- Auto Mono-- Auto Eos-- Auto Baso--         @ 16:05  WBC6.00  RBC4.20 Hgb12.3 Hct35.8  MCV85.2  Plts2  Auto Wzvyra46.1 Band-- Auto Lymph25.3 Atypical Lymph-- Reactive Lymph-- Auto Mono10.5 Auto Eos1.8 Auto Baso0.3              141  |  108  |  13  ----------------------------<  133<H>  3.8   |  26  |  0.77    Ca    9.4      04 Sep 2021 08:37    TPro  7.3  /  Alb  3.8  /  TBili  0.6  /  DBili  x   /  AST  18  /  ALT  28  /  AlkPhos  50  04 @ 08:37  PT13.4 INR1.15  PTT30.3   @ 16:05  PT13.0 INR1.12  PTT32.8    Urinalysis Basic - ( 03 Sep 2021 17:46 )    Color: Red / Appearance: Turbid / S.020 / pH: x  Gluc: x / Ketone: Trace  / Bili: Negative / Urobili: Negative   Blood: x / Protein: 500 mg/dL / Nitrite: Negative   Leuk Esterase: Trace / RBC: >50 /HPF / WBC 3-5   Sq Epi: x / Non Sq Epi: Few / Bacteria: Moderate        PERIPHERAL BLOOD SMEAR REVIEW:  RBC-normocytic, normochromic, no significant anisocytosis or poikilocytosis. No rouleaux/schistocytes or increased polychromasia.  WBC-normal in differential and morphology, no immature or abnormal cells seen.  Platelets-not seen on smear, no platelets clumping or giant platelets.       RADIOLOGY & ADDITIONAL STUDIES:  < from: CT Renal Stone Hunt (21 @ 16:35) >    EXAM:  CT RENAL STONE HUNT                            PROCEDURE DATE:  2021          INTERPRETATION:  CLINICAL INFORMATION: Right flank pain.    COMPARISON: None.    CONTRAST/COMPLICATIONS:  IV Contrast: NONE  0 cc administered   0 cc discarded  Oral Contrast: NONE  Complications: None reported at time of study completion    PROCEDURE:  CT of the Abdomen and Pelvis was performed.  Sagittal and coronal reformats were performed.    FINDINGS:  LOWER CHEST: Within normal limits.    LIVER: Within normal limits.  BILE DUCTS: Normal caliber.  GALLBLADDER: Within normal limits.  SPLEEN: Within normal limits.  PANCREAS: Within normal limits.  ADRENALS: Within normal limits.  KIDNEYS/URETERS: No evidence of renal calculi. Asymmetric hyperdense appearance of the right renal collecting system in comparison to the left side. This is indeterminate. In view of clinical history, a CT urogram may be helpful.    BLADDER: Within normal limits.  REPRODUCTIVE ORGANS: Prostate within normal limits.    BOWEL: No bowel obstruction. Appendix is normal.  PERITONEUM: No ascites.  VESSELS: Unremarkable as visualized.  RETROPERITONEUM/LYMPH NODES: No lymphadenopathy.  ABDOMINAL WALL: Within normal limits.  BONES: Within normal limits.    IMPRESSION:  Subtle asymmetric hyperdensity of the right renal collecting system, indeterminate. Considering right-sided symptomatology, a CT urogram may be helpful.        < end of copied text >

## 2021-09-05 LAB
ALBUMIN SERPL ELPH-MCNC: 3.6 G/DL — SIGNIFICANT CHANGE UP (ref 3.3–5)
ALP SERPL-CCNC: 49 U/L — SIGNIFICANT CHANGE UP (ref 40–120)
ALT FLD-CCNC: 27 U/L — SIGNIFICANT CHANGE UP (ref 12–78)
ANION GAP SERPL CALC-SCNC: 5 MMOL/L — SIGNIFICANT CHANGE UP (ref 5–17)
AST SERPL-CCNC: 19 U/L — SIGNIFICANT CHANGE UP (ref 15–37)
BILIRUB SERPL-MCNC: 0.5 MG/DL — SIGNIFICANT CHANGE UP (ref 0.2–1.2)
BUN SERPL-MCNC: 19 MG/DL — SIGNIFICANT CHANGE UP (ref 7–23)
CALCIUM SERPL-MCNC: 8.8 MG/DL — SIGNIFICANT CHANGE UP (ref 8.5–10.1)
CHLORIDE SERPL-SCNC: 106 MMOL/L — SIGNIFICANT CHANGE UP (ref 96–108)
CO2 SERPL-SCNC: 31 MMOL/L — SIGNIFICANT CHANGE UP (ref 22–31)
CREAT SERPL-MCNC: 1.1 MG/DL — SIGNIFICANT CHANGE UP (ref 0.5–1.3)
CULTURE RESULTS: NO GROWTH — SIGNIFICANT CHANGE UP
GLUCOSE SERPL-MCNC: 104 MG/DL — HIGH (ref 70–99)
HCT VFR BLD CALC: 36.4 % — LOW (ref 39–50)
HGB BLD-MCNC: 12.6 G/DL — LOW (ref 13–17)
MCHC RBC-ENTMCNC: 29.4 PG — SIGNIFICANT CHANGE UP (ref 27–34)
MCHC RBC-ENTMCNC: 34.6 GM/DL — SIGNIFICANT CHANGE UP (ref 32–36)
MCV RBC AUTO: 85 FL — SIGNIFICANT CHANGE UP (ref 80–100)
NRBC # BLD: 0 /100 WBCS — SIGNIFICANT CHANGE UP (ref 0–0)
PLATELET # BLD AUTO: 0 K/UL — CRITICAL LOW (ref 150–400)
POTASSIUM SERPL-MCNC: 3.8 MMOL/L — SIGNIFICANT CHANGE UP (ref 3.5–5.3)
POTASSIUM SERPL-SCNC: 3.8 MMOL/L — SIGNIFICANT CHANGE UP (ref 3.5–5.3)
PROT SERPL-MCNC: 6.9 G/DL — SIGNIFICANT CHANGE UP (ref 6–8.3)
RBC # BLD: 4.28 M/UL — SIGNIFICANT CHANGE UP (ref 4.2–5.8)
RBC # FLD: 12.7 % — SIGNIFICANT CHANGE UP (ref 10.3–14.5)
SODIUM SERPL-SCNC: 142 MMOL/L — SIGNIFICANT CHANGE UP (ref 135–145)
SPECIMEN SOURCE: SIGNIFICANT CHANGE UP
WBC # BLD: 10.83 K/UL — HIGH (ref 3.8–10.5)
WBC # FLD AUTO: 10.83 K/UL — HIGH (ref 3.8–10.5)

## 2021-09-05 PROCEDURE — 99233 SBSQ HOSP IP/OBS HIGH 50: CPT

## 2021-09-05 RX ORDER — IMMUNE GLOBULIN (HUMAN) 10 G/100ML
80 INJECTION INTRAVENOUS; SUBCUTANEOUS EVERY 24 HOURS
Refills: 0 | Status: COMPLETED | OUTPATIENT
Start: 2021-09-05 | End: 2021-09-06

## 2021-09-05 RX ADMIN — Medication 120 MILLIGRAM(S): at 05:26

## 2021-09-05 RX ADMIN — IMMUNE GLOBULIN (HUMAN) 133.33 GRAM(S): 10 INJECTION INTRAVENOUS; SUBCUTANEOUS at 10:54

## 2021-09-05 NOTE — PROGRESS NOTE ADULT - SUBJECTIVE AND OBJECTIVE BOX
MEDICAL ATTENDING NOTE    Patient is a 25y old  Male who presents with a chief complaint of Acute ITP (05 Sep 2021 08:54)      INTERVAL HPI/OVERNIGHT EVENTS: offers no new complaints today    MEDICATIONS  (STANDING):  dexAMETHasone  IVPB 40 milliGRAM(s) IV Intermittent daily  immune   globulin 10% (GAMMAGARD) IVPB 80 Gram(s) IV Intermittent every 24 hours  influenza   Vaccine 0.5 milliLiter(s) IntraMuscular once    MEDICATIONS  (PRN):  acetaminophen   Tablet .. 650 milliGRAM(s) Oral every 6 hours PRN Moderate Pain (4 - 6)      __________________________________________________  ----------------------------------------------------------------------------------  REVIEW OF SYSTEMS: negative      Vital Signs Last 24 Hrs  T(C): 36.9 (05 Sep 2021 12:39), Max: 37.1 (04 Sep 2021 20:28)  T(F): 98.5 (05 Sep 2021 12:39), Max: 98.8 (04 Sep 2021 20:28)  HR: 65 (05 Sep 2021 12:39) (59 - 73)  BP: 117/60 (05 Sep 2021 12:39) (103/64 - 122/67)  BP(mean): --  RR: 20 (05 Sep 2021 12:39) (18 - 20)  SpO2: 97% (05 Sep 2021 12:39) (97% - 97%)    _________________  PHYSICAL EXAM:  ---------------------------   NAD; Normocephalic;   LUNGS - no wheezing  HEART: S1 S2+   ABDOMEN: Soft, Nontender, non distended  EXTREMITIES: no cyanosis; no edema  NERVOUS SYSTEM:  Awake and alert; no focal neuro deficits appreciated    _________________________________________________  LABS:                        12.6   10.83 )-----------( 0        ( 05 Sep 2021 07:37 )             36.4     -    142  |  106  |  19  ----------------------------<  104<H>  3.8   |  31  |  1.10    Ca    8.8      05 Sep 2021 07:37    TPro  6.9  /  Alb  3.6  /  TBili  0.5  /  DBili  x   /  AST  19  /  ALT  27  /  AlkPhos  49  09-05    PT/INR - ( 04 Sep 2021 08:37 )   PT: 13.4 sec;   INR: 1.15 ratio         PTT - ( 04 Sep 2021 08:37 )  PTT:30.3 sec  Urinalysis Basic - ( 03 Sep 2021 17:46 )    Color: Red / Appearance: Turbid / S.020 / pH: x  Gluc: x / Ketone: Trace  / Bili: Negative / Urobili: Negative   Blood: x / Protein: 500 mg/dL / Nitrite: Negative   Leuk Esterase: Trace / RBC: >50 /HPF / WBC 3-5   Sq Epi: x / Non Sq Epi: Few / Bacteria: Moderate      CAPILLARY BLOOD GLUCOSE                    Plan of care was discussed with patient ; all questions and concerns were addressed and care was aligned with patient's wishes.             MEDICAL ATTENDING NOTE    Patient is a 25y old  Male who presents with a chief complaint of Acute ITP (05 Sep 2021 08:54)      INTERVAL HPI/OVERNIGHT EVENTS: offers no new complaints today    MEDICATIONS  (STANDING):  dexAMETHasone  IVPB 40 milliGRAM(s) IV Intermittent daily  immune   globulin 10% (GAMMAGARD) IVPB 80 Gram(s) IV Intermittent every 24 hours  influenza   Vaccine 0.5 milliLiter(s) IntraMuscular once    MEDICATIONS  (PRN):  acetaminophen   Tablet .. 650 milliGRAM(s) Oral every 6 hours PRN Moderate Pain (4 - 6)      __________________________________________________  ----------------------------------------------------------------------------------  REVIEW OF SYSTEMS: negative      Vital Signs Last 24 Hrs  T(C): 36.9 (05 Sep 2021 12:39), Max: 37.1 (04 Sep 2021 20:28)  T(F): 98.5 (05 Sep 2021 12:39), Max: 98.8 (04 Sep 2021 20:28)  HR: 65 (05 Sep 2021 12:39) (59 - 73)  BP: 117/60 (05 Sep 2021 12:39) (103/64 - 122/67)  RR: 20 (05 Sep 2021 12:39) (18 - 20)  SpO2: 97% (05 Sep 2021 12:39) (97% - 97%)    _________________  PHYSICAL EXAM:  ---------------------------   NAD; Normocephalic;   LUNGS - no wheezing, clear to auscultation  HEART: S1 S2+   ABDOMEN: Soft, Nontender, non distended, BS+  EXTREMITIES: no cyanosis; no edema  NERVOUS SYSTEM:  Awake and alert; no focal neuro deficits appreciated    _________________________________________________  LABS:                        12.6   10.83 )-----------( 0        ( 05 Sep 2021 07:37 )             36.4         142  |  106  |  19  ----------------------------<  104<H>  3.8   |  31  |  1.10    Ca    8.8      05 Sep 2021 07:37    TPro  6.9  /  Alb  3.6  /  TBili  0.5  /  DBili  x   /  AST  19  /  ALT  27  /  AlkPhos  49  09-05    PT/INR - ( 04 Sep 2021 08:37 )   PT: 13.4 sec;   INR: 1.15 ratio         PTT - ( 04 Sep 2021 08:37 )  PTT:30.3 sec  Urinalysis Basic - ( 03 Sep 2021 17:46 )    Color: Red / Appearance: Turbid / S.020 / pH: x  Gluc: x / Ketone: Trace  / Bili: Negative / Urobili: Negative   Blood: x / Protein: 500 mg/dL / Nitrite: Negative   Leuk Esterase: Trace / RBC: >50 /HPF / WBC 3-5   Sq Epi: x / Non Sq Epi: Few / Bacteria: Moderate      CAPILLARY BLOOD GLUCOSE                    Plan of care was discussed with patient ; all questions and concerns were addressed and care was aligned with patient's wishes.             MEDICAL ATTENDING NOTE    Patient is a 25y old  Male who presents with a chief complaint of Acute ITP (05 Sep 2021 08:54)      INTERVAL HPI/OVERNIGHT EVENTS: offers no new complaints today    MEDICATIONS  (STANDING):  dexAMETHasone  IVPB 40 milliGRAM(s) IV Intermittent daily  immune   globulin 10% (GAMMAGARD) IVPB 80 Gram(s) IV Intermittent every 24 hours  influenza   Vaccine 0.5 milliLiter(s) IntraMuscular once    MEDICATIONS  (PRN):  acetaminophen   Tablet .. 650 milliGRAM(s) Oral every 6 hours PRN Moderate Pain (4 - 6)      __________________________________________________  ----------------------------------------------------------------------------------  REVIEW OF SYSTEMS: negative for fever or SOB, hematuria +      Vital Signs Last 24 Hrs  T(C): 36.9 (05 Sep 2021 12:39), Max: 37.1 (04 Sep 2021 20:28)  T(F): 98.5 (05 Sep 2021 12:39), Max: 98.8 (04 Sep 2021 20:28)  HR: 65 (05 Sep 2021 12:39) (59 - 73)  BP: 117/60 (05 Sep 2021 12:39) (103/64 - 122/67)  RR: 20 (05 Sep 2021 12:39) (18 - 20)  SpO2: 97% (05 Sep 2021 12:39) (97% - 97%)    _________________  PHYSICAL EXAM:  ---------------------------   NAD; Normocephalic;   LUNGS - no wheezing, clear to auscultation  HEART: S1 S2+   ABDOMEN: Soft, Nontender, non distended, BS+  EXTREMITIES: no cyanosis; no edema  NERVOUS SYSTEM:  Awake and alert; no focal neuro deficits appreciated    _________________________________________________  LABS:                        12.6   10.83 )-----------( 0        ( 05 Sep 2021 07:37 )             36.4     -    142  |  106  |  19  ----------------------------<  104<H>  3.8   |  31  |  1.10    Ca    8.8      05 Sep 2021 07:37    TPro  6.9  /  Alb  3.6  /  TBili  0.5  /  DBili  x   /  AST  19  /  ALT  27  /  AlkPhos  49  09-05    PT/INR - ( 04 Sep 2021 08:37 )   PT: 13.4 sec;   INR: 1.15 ratio         PTT - ( 04 Sep 2021 08:37 )  PTT:30.3 sec  Urinalysis Basic - ( 03 Sep 2021 17:46 )    Color: Red / Appearance: Turbid / S.020 / pH: x  Gluc: x / Ketone: Trace  / Bili: Negative / Urobili: Negative   Blood: x / Protein: 500 mg/dL / Nitrite: Negative   Leuk Esterase: Trace / RBC: >50 /HPF / WBC 3-5   Sq Epi: x / Non Sq Epi: Few / Bacteria: Moderate      CAPILLARY BLOOD GLUCOSE                    Plan of care was discussed with patient ; all questions and concerns were addressed and care was aligned with patient's wishes.

## 2021-09-06 LAB
ALBUMIN SERPL ELPH-MCNC: 3.2 G/DL — LOW (ref 3.3–5)
ALP SERPL-CCNC: 44 U/L — SIGNIFICANT CHANGE UP (ref 40–120)
ALT FLD-CCNC: 27 U/L — SIGNIFICANT CHANGE UP (ref 12–78)
ANION GAP SERPL CALC-SCNC: 7 MMOL/L — SIGNIFICANT CHANGE UP (ref 5–17)
AST SERPL-CCNC: 13 U/L — LOW (ref 15–37)
BILIRUB SERPL-MCNC: 0.4 MG/DL — SIGNIFICANT CHANGE UP (ref 0.2–1.2)
BUN SERPL-MCNC: 21 MG/DL — SIGNIFICANT CHANGE UP (ref 7–23)
CALCIUM SERPL-MCNC: 8.4 MG/DL — LOW (ref 8.5–10.1)
CHLORIDE SERPL-SCNC: 105 MMOL/L — SIGNIFICANT CHANGE UP (ref 96–108)
CO2 SERPL-SCNC: 28 MMOL/L — SIGNIFICANT CHANGE UP (ref 22–31)
CREAT SERPL-MCNC: 1.1 MG/DL — SIGNIFICANT CHANGE UP (ref 0.5–1.3)
GLUCOSE SERPL-MCNC: 92 MG/DL — SIGNIFICANT CHANGE UP (ref 70–99)
HCT VFR BLD CALC: 34.5 % — LOW (ref 39–50)
HGB BLD-MCNC: 11.9 G/DL — LOW (ref 13–17)
MCHC RBC-ENTMCNC: 29.5 PG — SIGNIFICANT CHANGE UP (ref 27–34)
MCHC RBC-ENTMCNC: 34.5 GM/DL — SIGNIFICANT CHANGE UP (ref 32–36)
MCV RBC AUTO: 85.4 FL — SIGNIFICANT CHANGE UP (ref 80–100)
NRBC # BLD: 0 /100 WBCS — SIGNIFICANT CHANGE UP (ref 0–0)
PLATELET # BLD AUTO: 1 K/UL — CRITICAL LOW (ref 150–400)
POTASSIUM SERPL-MCNC: 3.7 MMOL/L — SIGNIFICANT CHANGE UP (ref 3.5–5.3)
POTASSIUM SERPL-SCNC: 3.7 MMOL/L — SIGNIFICANT CHANGE UP (ref 3.5–5.3)
PROT SERPL-MCNC: 7.9 G/DL — SIGNIFICANT CHANGE UP (ref 6–8.3)
RBC # BLD: 4.04 M/UL — LOW (ref 4.2–5.8)
RBC # FLD: 12.5 % — SIGNIFICANT CHANGE UP (ref 10.3–14.5)
SODIUM SERPL-SCNC: 140 MMOL/L — SIGNIFICANT CHANGE UP (ref 135–145)
WBC # BLD: 8.66 K/UL — SIGNIFICANT CHANGE UP (ref 3.8–10.5)
WBC # FLD AUTO: 8.66 K/UL — SIGNIFICANT CHANGE UP (ref 3.8–10.5)

## 2021-09-06 PROCEDURE — 99232 SBSQ HOSP IP/OBS MODERATE 35: CPT

## 2021-09-06 RX ADMIN — Medication 120 MILLIGRAM(S): at 05:41

## 2021-09-06 RX ADMIN — IMMUNE GLOBULIN (HUMAN) 133.33 GRAM(S): 10 INJECTION INTRAVENOUS; SUBCUTANEOUS at 11:55

## 2021-09-06 NOTE — PROGRESS NOTE ADULT - SUBJECTIVE AND OBJECTIVE BOX
MEDICAL ATTENDING NOTE    Patient is a 25y old  Male who presents with a chief complaint of Acute ITP (06 Sep 2021 11:23)      INTERVAL HPI/OVERNIGHT EVENTS: offers no new complaints today    MEDICATIONS  (STANDING):  influenza   Vaccine 0.5 milliLiter(s) IntraMuscular once    MEDICATIONS  (PRN):  acetaminophen   Tablet .. 650 milliGRAM(s) Oral every 6 hours PRN Moderate Pain (4 - 6)      __________________________________________________  ----------------------------------------------------------------------------------  REVIEW OF SYSTEMS: negative      Vital Signs Last 24 Hrs  T(C): 36.4 (06 Sep 2021 13:00), Max: 36.8 (05 Sep 2021 20:28)  T(F): 97.6 (06 Sep 2021 13:00), Max: 98.3 (05 Sep 2021 20:28)  HR: 61 (06 Sep 2021 13:00) (52 - 61)  BP: 119/68 (06 Sep 2021 13:00) (108/59 - 133/77)  BP(mean): --  RR: 19 (06 Sep 2021 13:00) (18 - 19)  SpO2: 98% (06 Sep 2021 13:00) (95% - 98%)    _________________  PHYSICAL EXAM:  ---------------------------   NAD; Normocephalic;   LUNGS - no wheezing  HEART: S1 S2+   ABDOMEN: Soft, Nontender, non distended  EXTREMITIES: no cyanosis; no edema  NERVOUS SYSTEM:  Awake and alert; no focal neuro deficits appreciated    _________________________________________________  LABS:                        11.9   8.66  )-----------( 1        ( 06 Sep 2021 09:21 )             34.5     09-06    140  |  105  |  21  ----------------------------<  92  3.7   |  28  |  1.10    Ca    8.4<L>      06 Sep 2021 09:21    TPro  7.9  /  Alb  3.2<L>  /  TBili  0.4  /  DBili  x   /  AST  13<L>  /  ALT  27  /  AlkPhos  44  09-06        CAPILLARY BLOOD GLUCOSE                    Plan of care was discussed with patient ; all questions and concerns were addressed and care was aligned with patient's wishes.             MEDICAL ATTENDING NOTE    Patient is a 25y old  Male who presents with a chief complaint of Acute ITP (06 Sep 2021 11:23)      INTERVAL HPI/OVERNIGHT EVENTS: offers no new complaints today    MEDICATIONS  (STANDING):  influenza   Vaccine 0.5 milliLiter(s) IntraMuscular once    MEDICATIONS  (PRN):  acetaminophen   Tablet .. 650 milliGRAM(s) Oral every 6 hours PRN Moderate Pain (4 - 6)      __________________________________________________  ----------------------------------------------------------------------------------  REVIEW OF SYSTEMS: negative for fever or cp; hematuria resolving; no HA      Vital Signs Last 24 Hrs  T(C): 36.4 (06 Sep 2021 13:00), Max: 36.8 (05 Sep 2021 20:28)  T(F): 97.6 (06 Sep 2021 13:00), Max: 98.3 (05 Sep 2021 20:28)  HR: 61 (06 Sep 2021 13:00) (52 - 61)  BP: 119/68 (06 Sep 2021 13:00) (108/59 - 133/77)  RR: 19 (06 Sep 2021 13:00) (18 - 19)  SpO2: 98% (06 Sep 2021 13:00) (95% - 98%)    _________________  PHYSICAL EXAM:  ---------------------------   NAD; Normocephalic;   LUNGS - no wheezing  HEART: S1 S2+   ABDOMEN: Soft, Nontender, non distended, BS+  EXTREMITIES: no cyanosis; no edema  NERVOUS SYSTEM:  Awake and alert; no focal neuro deficits appreciated    _________________________________________________  LABS:                        11.9   8.66  )-----------( 1        ( 06 Sep 2021 09:21 )             34.5     09-06    140  |  105  |  21  ----------------------------<  92  3.7   |  28  |  1.10    Ca    8.4<L>      06 Sep 2021 09:21    TPro  7.9  /  Alb  3.2<L>  /  TBili  0.4  /  DBili  x   /  AST  13<L>  /  ALT  27  /  AlkPhos  44  09-06        CAPILLARY BLOOD GLUCOSE                    Plan of care was discussed with patient ; all questions and concerns were addressed and care was aligned with patient's wishes.

## 2021-09-06 NOTE — PROGRESS NOTE ADULT - SUBJECTIVE AND OBJECTIVE BOX
All interim records and events noted.    still some blood in urine, no further lesions on tongue all healed  post first dose IVIG yesterday and last dayD#4 Decadron today, tolerated well, mild lower back pain after the IIVIG that spon resolved      MEDICATIONS  (STANDING):  immune   globulin 10% (GAMMAGARD) IVPB 80 Gram(s) IV Intermittent every 24 hours  influenza   Vaccine 0.5 milliLiter(s) IntraMuscular once    MEDICATIONS  (PRN):  acetaminophen   Tablet .. 650 milliGRAM(s) Oral every 6 hours PRN Moderate Pain (4 - 6)      Vital Signs Last 24 Hrs  T(C): 36.7 (06 Sep 2021 05:26), Max: 36.9 (05 Sep 2021 12:39)  T(F): 98.1 (06 Sep 2021 05:26), Max: 98.5 (05 Sep 2021 12:39)  HR: 52 (06 Sep 2021 05:26) (52 - 65)  BP: 108/59 (06 Sep 2021 05:26) (108/59 - 121/61)  BP(mean): --  RR: 18 (06 Sep 2021 05:26) (18 - 20)  SpO2: 95% (06 Sep 2021 05:26) (95% - 97%)    PHYSICAL EXAM  General: well developed  well nourished, in no acute distress  Head: atraumatic, normocephalic  ENT: sclera anicteric, buccal mucosa moist, no tongue or buccal mucosa lesions  Neck: supple, trachea midline, no palpable masses  CV: S1 S2, regular rate and rhythm  Lungs: clear to auscultation, no wheezes/rhonchi  Abdomen: soft, nontender, bowel sounds present, no palpable hepatosplenomegaly  Extrem: no clubbing/cyanosis/edema  Skin: no significant increased ecchymosis/petechiae  Neuro: alert and oriented X3,  no focal deficits      LABS:             11.9   8.66  )-----------( 1        ( 09-06 @ 09:21 )             34.5                12.6   10.83 )-----------( 0        ( 09-05 @ 07:37 )             36.4                12.8   7.00  )-----------( 0        ( 09-04 @ 08:37 )             36.4                12.3   6.00  )-----------( 2        ( 09-03 @ 16:05 )             35.8       09-06    140  |  105  |  21  ----------------------------<  92  3.7   |  28  |  1.10    Ca    8.4<L>      06 Sep 2021 09:21    TPro  7.9  /  Alb  3.2<L>  /  TBili  0.4  /  DBili  x   /  AST  13<L>  /  ALT  27  /  AlkPhos  44  09-06 09-04 @ 08:37  PT13.4 INR1.15  PTT30.3  09-03 @ 16:05  PT13.0 INR1.12  PTT32.8      RADIOLOGY & ADDITIONAL STUDIES:    IMPRESSION/RECOMMENDATIONS:

## 2021-09-06 NOTE — PROGRESS NOTE ADULT - PROBLEM SELECTOR PLAN 1
continue steroids  discussed with hematology attending - IVIG x 2 doses  Monitor platelet levels continue steroids and IVIG  Monitor platelet levels  Hematuria resolving

## 2021-09-07 LAB
ANION GAP SERPL CALC-SCNC: 6 MMOL/L — SIGNIFICANT CHANGE UP (ref 5–17)
BUN SERPL-MCNC: 20 MG/DL — SIGNIFICANT CHANGE UP (ref 7–23)
CALCIUM SERPL-MCNC: 8.4 MG/DL — LOW (ref 8.5–10.1)
CHLORIDE SERPL-SCNC: 106 MMOL/L — SIGNIFICANT CHANGE UP (ref 96–108)
CO2 SERPL-SCNC: 29 MMOL/L — SIGNIFICANT CHANGE UP (ref 22–31)
CREAT SERPL-MCNC: 1.1 MG/DL — SIGNIFICANT CHANGE UP (ref 0.5–1.3)
GLUCOSE SERPL-MCNC: 90 MG/DL — SIGNIFICANT CHANGE UP (ref 70–99)
HCT VFR BLD CALC: 34.9 % — LOW (ref 39–50)
HGB BLD-MCNC: 11.8 G/DL — LOW (ref 13–17)
MAGNESIUM SERPL-MCNC: 2.2 MG/DL — SIGNIFICANT CHANGE UP (ref 1.6–2.6)
MCHC RBC-ENTMCNC: 29 PG — SIGNIFICANT CHANGE UP (ref 27–34)
MCHC RBC-ENTMCNC: 33.8 GM/DL — SIGNIFICANT CHANGE UP (ref 32–36)
MCV RBC AUTO: 85.7 FL — SIGNIFICANT CHANGE UP (ref 80–100)
NRBC # BLD: 0 /100 WBCS — SIGNIFICANT CHANGE UP (ref 0–0)
PLATELET # BLD AUTO: 21 K/UL — LOW (ref 150–400)
POTASSIUM SERPL-MCNC: 3.7 MMOL/L — SIGNIFICANT CHANGE UP (ref 3.5–5.3)
POTASSIUM SERPL-SCNC: 3.7 MMOL/L — SIGNIFICANT CHANGE UP (ref 3.5–5.3)
RBC # BLD: 4.07 M/UL — LOW (ref 4.2–5.8)
RBC # FLD: 12.7 % — SIGNIFICANT CHANGE UP (ref 10.3–14.5)
SODIUM SERPL-SCNC: 141 MMOL/L — SIGNIFICANT CHANGE UP (ref 135–145)
WBC # BLD: 9.06 K/UL — SIGNIFICANT CHANGE UP (ref 3.8–10.5)
WBC # FLD AUTO: 9.06 K/UL — SIGNIFICANT CHANGE UP (ref 3.8–10.5)

## 2021-09-07 PROCEDURE — 99232 SBSQ HOSP IP/OBS MODERATE 35: CPT

## 2021-09-07 NOTE — DISCHARGE NOTE PROVIDER - HOSPITAL COURSE
HPI:  25 year old M w no significant PMH presents to the ED from home c/o R flank pain and blood in urine since this morning (9/3). Patient endorses 2-3 days of R flank pain rated 4/10 in severity, gingival bleeding after brushing his teeth, epistaxis, easy bruising with no inciting trauma. He admits to associated recent shortness of breath and fatigue. Patient endorses seeing blood in his urine this morning, which prompted him to come to the ED. He denies burning on urination or increased urinary frequency. Patient denies any previous episodes of bleeding/easy bruising or any family history of bleeding disorders. Denies headache, dizziness, nausea, vomiting, blood in stool.     ED course:  Vitals: T 98.1, HR 56, /64, RR 18, SpO2 100% on RA  Labs: H/H 12.3/35.8, platelets 2  Urinalysis: red, turbid, trace ketones, protein 500, trace LE, large blood, >50 RBCs, moderate bacteria  CT a/p: Subtle asymmetric hyperdensity of the right renal collecting system, indeterminate  Given: Decadon 40mg IVPB x1, 1L IV NS x1   (03 Sep 2021 18:52)   HPI:  25 year old M w no significant PMH presents to the ED from home c/o R flank pain and blood in urine since this morning (9/3). Patient endorses 2-3 days of R flank pain rated 4/10 in severity, gingival bleeding after brushing his teeth, epistaxis, easy bruising with no inciting trauma. He admits to associated recent shortness of breath and fatigue. Patient endorses seeing blood in his urine this morning, which prompted him to come to the ED. He denies burning on urination or increased urinary frequency. Patient denies any previous episodes of bleeding/easy bruising or any family history of bleeding disorders. Denies headache, dizziness, nausea, vomiting, blood in stool.     ED course:  Vitals: T 98.1, HR 56, /64, RR 18, SpO2 100% on RA  Labs: H/H 12.3/35.8, platelets 2  Urinalysis: red, turbid, trace ketones, protein 500, trace LE, large blood, >50 RBCs, moderate bacteria  CT a/p: Subtle asymmetric hyperdensity of the right renal collecting system, indeterminate  Given: Decadon 40mg IVPB x1, 1L IV NS x1   (03 Sep 2021 18:52)      COURSE: Patient was hospitalized due to c/o flank pain and bloody urine. He was found to have severely low platelet level and diagnosed with ITP. He was seen in consultation by hematologist and treated with IV steroids and IVIG. Platelets eventually improved and his symptoms resolved.  Patient was discharged in stable medical condition.

## 2021-09-07 NOTE — PROGRESS NOTE ADULT - ASSESSMENT
25 year old M w no significant PMH admitted for acute ITP. 
25 year old M w no significant PMH admitted for acute ITP. 
26 y/o man w acute right flank pain and gross hematuria, also black/petechial lesions on tongue without active bleeds,  found w isolated thrombocytopenia, 2k.  No recent illness/infection/vaccination/new meds, did start new brand of recreational smoke last week.  Started on IV decadron 40mg x4 days  Review of kiran blood morphology confirms lack of platelets, no other atypia    -most c/w ITP, immune thrombocytopenic purpura  -post D#4/4 IV Decadron 40mg today  -for D#2/2 80grams IVIG today  -no sig change in machine count plts but on manual now can see rare platelets  -tongue and bucal lesins now resolved  -continue monitor serial CBC, repeat in AM    discussed w pt, discussed w Nursing
26 y/o man w acute right flank pain and gross hematuria, also black/petechial lesions on tongue without active bleeds,  found w isolated thrombocytopenia, 2k.  No recent illness/infection/vaccination/new meds, did start new brand of recreational smoke last week.  Started on IV decadron 40mg x4 days  Review of kiran blood morphology confirms lack of platelets, no other atypia    -most c/w ITP, immune thrombocytopenic purpura  -plts still 0k, confirmed w Lab  -D#3/4 Decadron today, start IVIG 1gm/kg for 2 days =80gms today and tomorrow  -discussed w pt, made aware this is blood product, potential side effects including but not limited to chills, rash, back pain  -cont monitor serial CBC  -discussed w pharmacy and nursing 
24 y/o man w acute right flank pain and gross hematuria, also black/petechial lesions on tongue without active bleeds,  found w isolated thrombocytopenia, 2k.  No recent illness/infection/vaccination/new meds, did start new brand of recreational smoke last week.  Started on IV decadron 40mg x4 days  Review of kiran blood morphology confirms lack of platelets, no other atypia    -most c/w ITP, immune thrombocytopenic purpura  -post 4days Decadron and 2 days IVIG yesterday  -plts 21k, no further hematuria, tongue blisters scabs had resolved yesterday, no new stigmata of bleeds  -would keep pt here until tomorrow, if plts cant to increase, >30k, then can discharge home w office f/u  -time to response sl slow in this pt but still within parameter  -f/u HIV test    discussed w pt, discussed w Medicine
25 year old M w no significant PMH admitted for acute ITP. 
25 year old M w no significant PMH admitted for acute ITP.

## 2021-09-07 NOTE — PROGRESS NOTE ADULT - SUBJECTIVE AND OBJECTIVE BOX
All interim records and events noted.    urine has been clear yellow since yesterday  no new signs of gross bleeds/stigmata of bleed      MEDICATIONS  (STANDING):  influenza   Vaccine 0.5 milliLiter(s) IntraMuscular once    MEDICATIONS  (PRN):  acetaminophen   Tablet .. 650 milliGRAM(s) Oral every 6 hours PRN Moderate Pain (4 - 6)      Vital Signs Last 24 Hrs  T(C): 36.7 (07 Sep 2021 06:54), Max: 36.9 (06 Sep 2021 21:38)  T(F): 98 (07 Sep 2021 06:54), Max: 98.4 (06 Sep 2021 21:38)  HR: 63 (07 Sep 2021 06:54) (58 - 63)  BP: 135/76 (07 Sep 2021 06:54) (119/68 - 135/76)  BP(mean): --  RR: 18 (07 Sep 2021 06:54) (18 - 19)  SpO2: 98% (07 Sep 2021 06:54) (97% - 98%)    PHYSICAL EXAM  General: well developed  well nourished, in no acute distress  Head: atraumatic, normocephalic  ENT: sclera anicteric, buccal mucosa moist  Neck: supple, trachea midline, no palpable masses  CV: S1 S2, regular rate and rhythm  Lungs: clear to auscultation, no wheezes/rhonchi  Abdomen: soft, nontender, bowel sounds present, no palpable masses  Extrem: no clubbing/cyanosis/edema  Skin: no significant increased ecchymosis/petechiae  Neuro: alert and oriented X3,  no focal deficits      LABS:             11.8   9.06  )-----------( 21       ( 09-07 @ 08:00 )             34.9                11.9   8.66  )-----------( 1        ( 09-06 @ 09:21 )             34.5                12.6   10.83 )-----------( 0        ( 09-05 @ 07:37 )             36.4       09-07    141  |  106  |  20  ----------------------------<  90  3.7   |  29  |  1.10    Ca    8.4<L>      07 Sep 2021 08:00  Mg     2.2     09-07    TPro  7.9  /  Alb  3.2<L>  /  TBili  0.4  /  DBili  x   /  AST  13<L>  /  ALT  27  /  AlkPhos  44  09-06 09-04 @ 08:37  PT13.4 INR1.15  PTT30.3  09-03 @ 16:05  PT13.0 INR1.12  PTT32.8      RADIOLOGY & ADDITIONAL STUDIES:    IMPRESSION/RECOMMENDATIONS:

## 2021-09-07 NOTE — DISCHARGE NOTE PROVIDER - NSDCCPCAREPLAN_GEN_ALL_CORE_FT
PRINCIPAL DISCHARGE DIAGNOSIS  Diagnosis: Immune thrombocytopenia  Assessment and Plan of Treatment: You were hospitalized due to immune thrombocytopenia with very low plateklet counts causing bleeding. You were treated with dexamethasone and IVIG with gradual improvement in your platelet level. Your symoptoms have resolved and your platelet has improved. Please follow up with the hematologist and your PCP within 1 week of discharge from the hospital for continued monitoring and care outpatient.   Return to the ER if bleeding recurs

## 2021-09-07 NOTE — DISCHARGE NOTE PROVIDER - CARE PROVIDER_API CALL
Zahraa Moy)  Medical Oncology  [address] [facility], [address] [place] [address]  Phone: [phone]  Fax: [phone]  Follow Up Time:

## 2021-09-07 NOTE — PROGRESS NOTE ADULT - PROBLEM SELECTOR PLAN 1
continue steroids   s/P IVIG  Monitor platelet levels- improving  Hematuria resolved  Anticipate discharge in am if platelet continue to uptrend and patient remains clinically stable.

## 2021-09-07 NOTE — PROGRESS NOTE ADULT - PROBLEM SELECTOR PROBLEM 1
Idiopathic thrombocytopenic purpura (ITP)

## 2021-09-07 NOTE — DISCHARGE NOTE PROVIDER - NSDCPNSUBOBJ_GEN_ALL_CORE
MEDICAL ATTENDING DISCHARGE NOTE :    Patient is a 25y old  Male who presents with a chief complaint of Acute ITP (07 Sep 2021 11:46)      INTERVAL HPI / OVERNIGHT EVENTS: patient with uneventful night and offers no new complaints    ----------------------------------------------------------------------------------  REVIEW OF SYSTEMS: no fever; no SOB, no headache      Vital Signs Last 24 Hrs  T(C): 36.8 (08 Sep 2021 04:40), Max: 36.8 (07 Sep 2021 11:55)  T(F): 98.2 (08 Sep 2021 04:40), Max: 98.3 (07 Sep 2021 20:03)  HR: 57 (08 Sep 2021 04:40) (57 - 61)  BP: 111/55 (08 Sep 2021 04:40) (111/55 - 147/50)  RR: 18 (08 Sep 2021 04:40) (17 - 20)  SpO2: 97% (08 Sep 2021 04:40) (96% - 98%)    _________________  PHYSICAL EXAM:  ---------------------------   NAD; Normocephalic  LUNGS - no wheezing, bilateral air entry  HEART: S1 S2+   ABDOMEN: Soft, Nontender, non distended, BS+  EXTREMITIES: no cyanosis; no edema      _________________________________________________  LABS:                        11.8   9.06  )-----------( 21       ( 07 Sep 2021 08:00 )             34.9     09-07    141  |  106  |  20  ----------------------------<  90  3.7   |  29  |  1.10    Ca    8.4<L>      07 Sep 2021 08:00  Mg     2.2     09-07    A/P- ITP-   responded to steroids and IVIG x 2 doses.  Clinically stable for discharge home today  Follow up with PCP and hematologist within 1 week of discharge.  Return to ED if recurrence of symptoms including spontaneous bleeding.        Plan of care, test results and findings were  discussed with patient; all questions and concerns were addressed and care was aligned with patient's wishes.  PMD follow up after discharge from the hospital for continued care and outpatient monitoring was advised.  Discharge plans was discussed with care team including the nursing staff  and unit discharge planner.  About 33 minutes was spent to coordinate this discharge plan.

## 2021-09-07 NOTE — PROGRESS NOTE ADULT - SUBJECTIVE AND OBJECTIVE BOX
MEDICAL ATTENDING NOTE    Patient is a 25y old  Male who presents with a chief complaint of Acute ITP (06 Sep 2021 13:33)      INTERVAL HPI/OVERNIGHT EVENTS: offers no new complaints today    MEDICATIONS  (STANDING):  influenza   Vaccine 0.5 milliLiter(s) IntraMuscular once    MEDICATIONS  (PRN):  acetaminophen   Tablet .. 650 milliGRAM(s) Oral every 6 hours PRN Moderate Pain (4 - 6)      __________________________________________________  ----------------------------------------------------------------------------------  REVIEW OF SYSTEMS: negative      Vital Signs Last 24 Hrs  T(C): 36.7 (07 Sep 2021 06:54), Max: 36.9 (06 Sep 2021 21:38)  T(F): 98 (07 Sep 2021 06:54), Max: 98.4 (06 Sep 2021 21:38)  HR: 63 (07 Sep 2021 06:54) (58 - 63)  BP: 135/76 (07 Sep 2021 06:54) (119/68 - 135/76)  BP(mean): --  RR: 18 (07 Sep 2021 06:54) (18 - 19)  SpO2: 98% (07 Sep 2021 06:54) (97% - 98%)    _________________  PHYSICAL EXAM:  ---------------------------   NAD; Normocephalic;   LUNGS - no wheezing  HEART: S1 S2+   ABDOMEN: Soft, Nontender, non distended  EXTREMITIES: no cyanosis; no edema  NERVOUS SYSTEM:  Awake and alert; no focal neuro deficits appreciated    _________________________________________________  LABS:                        11.8   9.06  )-----------( 21       ( 07 Sep 2021 08:00 )             34.9     09-07    141  |  106  |  20  ----------------------------<  90  3.7   |  29  |  1.10    Ca    8.4<L>      07 Sep 2021 08:00  Mg     2.2     09-07    TPro  7.9  /  Alb  3.2<L>  /  TBili  0.4  /  DBili  x   /  AST  13<L>  /  ALT  27  /  AlkPhos  44  09-06        CAPILLARY BLOOD GLUCOSE                    Plan of care was discussed with patient ; all questions and concerns were addressed and care was aligned with patient's wishes.             MEDICAL ATTENDING NOTE    Patient is a 25y old  Male who presents with a chief complaint of Acute ITP (06 Sep 2021 13:33)      INTERVAL HPI/OVERNIGHT EVENTS: offers no new complaints today    MEDICATIONS  (STANDING):  influenza   Vaccine 0.5 milliLiter(s) IntraMuscular once    MEDICATIONS  (PRN):  acetaminophen   Tablet .. 650 milliGRAM(s) Oral every 6 hours PRN Moderate Pain (4 - 6)      __________________________________________________  ----------------------------------------------------------------------------------  REVIEW OF SYSTEMS: negative for fever, bleeding or HA      Vital Signs Last 24 Hrs  T(C): 36.7 (07 Sep 2021 06:54), Max: 36.9 (06 Sep 2021 21:38)  T(F): 98 (07 Sep 2021 06:54), Max: 98.4 (06 Sep 2021 21:38)  HR: 63 (07 Sep 2021 06:54) (58 - 63)  BP: 135/76 (07 Sep 2021 06:54) (119/68 - 135/76)  RR: 18 (07 Sep 2021 06:54) (18 - 19)  SpO2: 98% (07 Sep 2021 06:54) (97% - 98%)    _________________  PHYSICAL EXAM:  ---------------------------   NAD; Normocephalic;   LUNGS - no wheezing  HEART: S1 S2+   ABDOMEN: Soft, Nontender, non distended  EXTREMITIES: no cyanosis; no edema  NERVOUS SYSTEM:  Awake and alert; no focal neuro deficits appreciated    _________________________________________________  LABS:                        11.8   9.06  )-----------( 21       ( 07 Sep 2021 08:00 )             34.9     09-07    141  |  106  |  20  ----------------------------<  90  3.7   |  29  |  1.10    Ca    8.4<L>      07 Sep 2021 08:00  Mg     2.2     09-07    TPro  7.9  /  Alb  3.2<L>  /  TBili  0.4  /  DBili  x   /  AST  13<L>  /  ALT  27  /  AlkPhos  44  09-06        CAPILLARY BLOOD GLUCOSE                    Plan of care was discussed with patient ; all questions and concerns were addressed and care was aligned with patient's wishes.

## 2021-09-07 NOTE — DISCHARGE NOTE PROVIDER - NSDCMRMEDTOKEN_GEN_ALL_CORE_FT
TO WHOM IT MAY CONCERN: THE ABOVE NAMED IS CURRENTLY HOSPITALIZED AT [facility] UNDER MY CARE.  PLEASE CALL  FOR ANY QUESTIONS OR FURTHER ASSISTANCE.

## 2021-09-07 NOTE — PROGRESS NOTE ADULT - PROBLEM SELECTOR PLAN 2
patient ambulatory.  No chemical prophylactic measure due to risk of bleeding from severe thrombocytopenia
- Avoid pharmacologic DVT ppx

## 2021-09-08 VITALS
HEART RATE: 57 BPM | SYSTOLIC BLOOD PRESSURE: 111 MMHG | RESPIRATION RATE: 18 BRPM | OXYGEN SATURATION: 97 % | WEIGHT: 184.97 LBS | TEMPERATURE: 98 F | DIASTOLIC BLOOD PRESSURE: 55 MMHG

## 2021-09-08 LAB
HCT VFR BLD CALC: 36.5 % — LOW (ref 39–50)
HCT VFR BLD CALC: 36.6 % — LOW (ref 39–50)
HGB BLD-MCNC: 12.6 G/DL — LOW (ref 13–17)
HGB BLD-MCNC: 12.8 G/DL — LOW (ref 13–17)
MCHC RBC-ENTMCNC: 29.6 PG — SIGNIFICANT CHANGE UP (ref 27–34)
MCHC RBC-ENTMCNC: 29.8 PG — SIGNIFICANT CHANGE UP (ref 27–34)
MCHC RBC-ENTMCNC: 34.4 GM/DL — SIGNIFICANT CHANGE UP (ref 32–36)
MCHC RBC-ENTMCNC: 35.1 GM/DL — SIGNIFICANT CHANGE UP (ref 32–36)
MCV RBC AUTO: 84.5 FL — SIGNIFICANT CHANGE UP (ref 80–100)
MCV RBC AUTO: 86.5 FL — SIGNIFICANT CHANGE UP (ref 80–100)
NRBC # BLD: 0 /100 WBCS — SIGNIFICANT CHANGE UP (ref 0–0)
NRBC # BLD: 0 /100 WBCS — SIGNIFICANT CHANGE UP (ref 0–0)
PLATELET # BLD AUTO: 49 K/UL — LOW (ref 150–400)
PLATELET # BLD AUTO: SIGNIFICANT CHANGE UP (ref 150–400)
RBC # BLD: 4.23 M/UL — SIGNIFICANT CHANGE UP (ref 4.2–5.8)
RBC # BLD: 4.32 M/UL — SIGNIFICANT CHANGE UP (ref 4.2–5.8)
RBC # FLD: 12.6 % — SIGNIFICANT CHANGE UP (ref 10.3–14.5)
RBC # FLD: 12.8 % — SIGNIFICANT CHANGE UP (ref 10.3–14.5)
WBC # BLD: 6.47 K/UL — SIGNIFICANT CHANGE UP (ref 3.8–10.5)
WBC # BLD: 6.98 K/UL — SIGNIFICANT CHANGE UP (ref 3.8–10.5)
WBC # FLD AUTO: 6.47 K/UL — SIGNIFICANT CHANGE UP (ref 3.8–10.5)
WBC # FLD AUTO: 6.98 K/UL — SIGNIFICANT CHANGE UP (ref 3.8–10.5)

## 2021-09-08 PROCEDURE — 85027 COMPLETE CBC AUTOMATED: CPT

## 2021-09-08 PROCEDURE — 83735 ASSAY OF MAGNESIUM: CPT

## 2021-09-08 PROCEDURE — 85730 THROMBOPLASTIN TIME PARTIAL: CPT

## 2021-09-08 PROCEDURE — 80053 COMPREHEN METABOLIC PANEL: CPT

## 2021-09-08 PROCEDURE — 86900 BLOOD TYPING SEROLOGIC ABO: CPT

## 2021-09-08 PROCEDURE — 74176 CT ABD & PELVIS W/O CONTRAST: CPT | Mod: MA

## 2021-09-08 PROCEDURE — 86769 SARS-COV-2 COVID-19 ANTIBODY: CPT

## 2021-09-08 PROCEDURE — 86850 RBC ANTIBODY SCREEN: CPT

## 2021-09-08 PROCEDURE — 99285 EMERGENCY DEPT VISIT HI MDM: CPT

## 2021-09-08 PROCEDURE — 85610 PROTHROMBIN TIME: CPT

## 2021-09-08 PROCEDURE — 81001 URINALYSIS AUTO W/SCOPE: CPT

## 2021-09-08 PROCEDURE — 96361 HYDRATE IV INFUSION ADD-ON: CPT

## 2021-09-08 PROCEDURE — 93005 ELECTROCARDIOGRAM TRACING: CPT

## 2021-09-08 PROCEDURE — 86901 BLOOD TYPING SEROLOGIC RH(D): CPT

## 2021-09-08 PROCEDURE — U0003: CPT

## 2021-09-08 PROCEDURE — 36415 COLL VENOUS BLD VENIPUNCTURE: CPT

## 2021-09-08 PROCEDURE — 96360 HYDRATION IV INFUSION INIT: CPT

## 2021-09-08 PROCEDURE — 87389 HIV-1 AG W/HIV-1&-2 AB AG IA: CPT

## 2021-09-08 PROCEDURE — U0005: CPT

## 2021-09-08 PROCEDURE — 85025 COMPLETE CBC W/AUTO DIFF WBC: CPT

## 2021-09-08 PROCEDURE — 99239 HOSP IP/OBS DSCHRG MGMT >30: CPT

## 2021-09-08 PROCEDURE — 87086 URINE CULTURE/COLONY COUNT: CPT

## 2021-09-08 PROCEDURE — 80048 BASIC METABOLIC PNL TOTAL CA: CPT

## 2021-09-08 NOTE — DISCHARGE NOTE NURSING/CASE MANAGEMENT/SOCIAL WORK - PATIENT PORTAL LINK FT
You can access the FollowMyHealth Patient Portal offered by [facility] by registering at the following website: [email or web address]. By joining [facility]’s FollowMyHealth portal, you will also be able to view your health information using other applications (apps) compatible with our system.

## 2021-09-13 ENCOUNTER — INPATIENT (INPATIENT)
Facility: HOSPITAL | Age: 26
LOS: 4 days | Discharge: LEFT BEFORE DISCHARGE | DRG: 813 | End: 2021-09-18
Attending: STUDENT IN AN ORGANIZED HEALTH CARE EDUCATION/TRAINING PROGRAM | Admitting: STUDENT IN AN ORGANIZED HEALTH CARE EDUCATION/TRAINING PROGRAM
Payer: COMMERCIAL

## 2021-09-13 VITALS
WEIGHT: 190.04 LBS | SYSTOLIC BLOOD PRESSURE: 120 MMHG | HEIGHT: 74 IN | HEART RATE: 81 BPM | OXYGEN SATURATION: 100 % | RESPIRATION RATE: 16 BRPM | TEMPERATURE: 98 F | DIASTOLIC BLOOD PRESSURE: 77 MMHG

## 2021-09-13 LAB
ALBUMIN SERPL ELPH-MCNC: 3.3 G/DL — SIGNIFICANT CHANGE UP (ref 3.3–5)
ALP SERPL-CCNC: 45 U/L — SIGNIFICANT CHANGE UP (ref 40–120)
ALT FLD-CCNC: 82 U/L — HIGH (ref 12–78)
ANION GAP SERPL CALC-SCNC: 6 MMOL/L — SIGNIFICANT CHANGE UP (ref 5–17)
AST SERPL-CCNC: 54 U/L — HIGH (ref 15–37)
BILIRUB SERPL-MCNC: 0.2 MG/DL — SIGNIFICANT CHANGE UP (ref 0.2–1.2)
BUN SERPL-MCNC: 15 MG/DL — SIGNIFICANT CHANGE UP (ref 7–23)
CALCIUM SERPL-MCNC: 8.5 MG/DL — SIGNIFICANT CHANGE UP (ref 8.5–10.1)
CHLORIDE SERPL-SCNC: 106 MMOL/L — SIGNIFICANT CHANGE UP (ref 96–108)
CO2 SERPL-SCNC: 26 MMOL/L — SIGNIFICANT CHANGE UP (ref 22–31)
CREAT SERPL-MCNC: 1 MG/DL — SIGNIFICANT CHANGE UP (ref 0.5–1.3)
GLUCOSE SERPL-MCNC: 106 MG/DL — HIGH (ref 70–99)
POTASSIUM SERPL-MCNC: 3.5 MMOL/L — SIGNIFICANT CHANGE UP (ref 3.5–5.3)
POTASSIUM SERPL-SCNC: 3.5 MMOL/L — SIGNIFICANT CHANGE UP (ref 3.5–5.3)
PROT SERPL-MCNC: 7.9 G/DL — SIGNIFICANT CHANGE UP (ref 6–8.3)
SODIUM SERPL-SCNC: 138 MMOL/L — SIGNIFICANT CHANGE UP (ref 135–145)

## 2021-09-13 PROCEDURE — 99285 EMERGENCY DEPT VISIT HI MDM: CPT

## 2021-09-13 PROCEDURE — 71046 X-RAY EXAM CHEST 2 VIEWS: CPT | Mod: 26

## 2021-09-13 NOTE — ED ADULT TRIAGE NOTE - CHIEF COMPLAINT QUOTE
ITP bleeding from the tongue and gums.  discharged last Wednesday, told to come back if any bleeding.

## 2021-09-13 NOTE — ED ADULT NURSE NOTE - OBJECTIVE STATEMENT
pt was recently diagnosed with ITP.  s/p 5 day hospital admission, d/c'd home 6 days ago.  today noted bleeding tongue and gums

## 2021-09-13 NOTE — ED ADULT NURSE NOTE - NS ED NOTE  TALK SOMEONE YN
[No Acute Distress] : no acute distress [Well Nourished] : well nourished [Well Developed] : well developed [Well-Appearing] : well-appearing [Normal Sclera/Conjunctiva] : normal sclera/conjunctiva [PERRL] : pupils equal round and reactive to light [EOMI] : extraocular movements intact [Normal Outer Ear/Nose] : the outer ears and nose were normal in appearance [Normal Oropharynx] : the oropharynx was normal [No JVD] : no jugular venous distention [No Lymphadenopathy] : no lymphadenopathy [Supple] : supple [Thyroid Normal, No Nodules] : the thyroid was normal and there were no nodules present [No Respiratory Distress] : no respiratory distress  [No Accessory Muscle Use] : no accessory muscle use [Clear to Auscultation] : lungs were clear to auscultation bilaterally [Normal Rate] : normal rate  [Regular Rhythm] : with a regular rhythm [Normal S1, S2] : normal S1 and S2 [No Murmur] : no murmur heard [Pedal Pulses Present] : the pedal pulses are present [No Edema] : there was no peripheral edema [Soft] : abdomen soft [Non Tender] : non-tender [Non-distended] : non-distended [No Masses] : no abdominal mass palpated [No HSM] : no HSM [Normal Bowel Sounds] : normal bowel sounds [Normal Posterior Cervical Nodes] : no posterior cervical lymphadenopathy [Normal Anterior Cervical Nodes] : no anterior cervical lymphadenopathy [No CVA Tenderness] : no CVA  tenderness [No Spinal Tenderness] : no spinal tenderness [No Joint Swelling] : no joint swelling [Grossly Normal Strength/Tone] : grossly normal strength/tone [No Rash] : no rash [Coordination Grossly Intact] : coordination grossly intact [No Focal Deficits] : no focal deficits [Normal Gait] : normal gait [Normal Affect] : the affect was normal [Normal Insight/Judgement] : insight and judgment were intact No

## 2021-09-14 DIAGNOSIS — D69.3 IMMUNE THROMBOCYTOPENIC PURPURA: ICD-10-CM

## 2021-09-14 DIAGNOSIS — D69.6 THROMBOCYTOPENIA, UNSPECIFIED: ICD-10-CM

## 2021-09-14 DIAGNOSIS — Z29.9 ENCOUNTER FOR PROPHYLACTIC MEASURES, UNSPECIFIED: ICD-10-CM

## 2021-09-14 LAB
ALBUMIN SERPL ELPH-MCNC: 3.6 G/DL — SIGNIFICANT CHANGE UP (ref 3.3–5)
ALP SERPL-CCNC: 47 U/L — SIGNIFICANT CHANGE UP (ref 40–120)
ALT FLD-CCNC: 91 U/L — HIGH (ref 12–78)
ANION GAP SERPL CALC-SCNC: 4 MMOL/L — LOW (ref 5–17)
APTT BLD: 28.8 SEC — SIGNIFICANT CHANGE UP (ref 27.5–35.5)
AST SERPL-CCNC: 56 U/L — HIGH (ref 15–37)
BASOPHILS # BLD AUTO: 0 K/UL — SIGNIFICANT CHANGE UP (ref 0–0.2)
BASOPHILS # BLD AUTO: 0.02 K/UL — SIGNIFICANT CHANGE UP (ref 0–0.2)
BASOPHILS NFR BLD AUTO: 0 % — SIGNIFICANT CHANGE UP (ref 0–2)
BASOPHILS NFR BLD AUTO: 0.3 % — SIGNIFICANT CHANGE UP (ref 0–2)
BILIRUB SERPL-MCNC: 0.4 MG/DL — SIGNIFICANT CHANGE UP (ref 0.2–1.2)
BLD GP AB SCN SERPL QL: SIGNIFICANT CHANGE UP
BUN SERPL-MCNC: 13 MG/DL — SIGNIFICANT CHANGE UP (ref 7–23)
CALCIUM SERPL-MCNC: 8.9 MG/DL — SIGNIFICANT CHANGE UP (ref 8.5–10.1)
CHLORIDE SERPL-SCNC: 106 MMOL/L — SIGNIFICANT CHANGE UP (ref 96–108)
CO2 SERPL-SCNC: 29 MMOL/L — SIGNIFICANT CHANGE UP (ref 22–31)
CREAT SERPL-MCNC: 1.1 MG/DL — SIGNIFICANT CHANGE UP (ref 0.5–1.3)
EOSINOPHIL # BLD AUTO: 0.07 K/UL — SIGNIFICANT CHANGE UP (ref 0–0.5)
EOSINOPHIL # BLD AUTO: 0.13 K/UL — SIGNIFICANT CHANGE UP (ref 0–0.5)
EOSINOPHIL NFR BLD AUTO: 0.9 % — SIGNIFICANT CHANGE UP (ref 0–6)
EOSINOPHIL NFR BLD AUTO: 2 % — SIGNIFICANT CHANGE UP (ref 0–6)
GLUCOSE SERPL-MCNC: 122 MG/DL — HIGH (ref 70–99)
HCT VFR BLD CALC: 36 % — LOW (ref 39–50)
HCT VFR BLD CALC: 36.6 % — LOW (ref 39–50)
HGB BLD-MCNC: 12.4 G/DL — LOW (ref 13–17)
HGB BLD-MCNC: 12.6 G/DL — LOW (ref 13–17)
IMM GRANULOCYTES NFR BLD AUTO: 2.2 % — HIGH (ref 0–1.5)
INR BLD: 1.13 RATIO — SIGNIFICANT CHANGE UP (ref 0.88–1.16)
LYMPHOCYTES # BLD AUTO: 1.25 K/UL — SIGNIFICANT CHANGE UP (ref 1–3.3)
LYMPHOCYTES # BLD AUTO: 16.4 % — SIGNIFICANT CHANGE UP (ref 13–44)
LYMPHOCYTES # BLD AUTO: 2.21 K/UL — SIGNIFICANT CHANGE UP (ref 1–3.3)
LYMPHOCYTES # BLD AUTO: 35 % — SIGNIFICANT CHANGE UP (ref 13–44)
MANUAL SMEAR VERIFICATION: YES — SIGNIFICANT CHANGE UP
MCHC RBC-ENTMCNC: 29.5 PG — SIGNIFICANT CHANGE UP (ref 27–34)
MCHC RBC-ENTMCNC: 30.1 PG — SIGNIFICANT CHANGE UP (ref 27–34)
MCHC RBC-ENTMCNC: 34.4 GM/DL — SIGNIFICANT CHANGE UP (ref 32–36)
MCHC RBC-ENTMCNC: 34.4 GM/DL — SIGNIFICANT CHANGE UP (ref 32–36)
MCV RBC AUTO: 85.5 FL — SIGNIFICANT CHANGE UP (ref 80–100)
MCV RBC AUTO: 87.4 FL — SIGNIFICANT CHANGE UP (ref 80–100)
MONOCYTES # BLD AUTO: 0.32 K/UL — SIGNIFICANT CHANGE UP (ref 0–0.9)
MONOCYTES # BLD AUTO: 0.5 K/UL — SIGNIFICANT CHANGE UP (ref 0–0.9)
MONOCYTES NFR BLD AUTO: 4.2 % — SIGNIFICANT CHANGE UP (ref 2–14)
MONOCYTES NFR BLD AUTO: 8 % — SIGNIFICANT CHANGE UP (ref 2–14)
NEUTROPHILS # BLD AUTO: 3.47 K/UL — SIGNIFICANT CHANGE UP (ref 1.8–7.4)
NEUTROPHILS # BLD AUTO: 5.78 K/UL — SIGNIFICANT CHANGE UP (ref 1.8–7.4)
NEUTROPHILS NFR BLD AUTO: 52 % — SIGNIFICANT CHANGE UP (ref 43–77)
NEUTROPHILS NFR BLD AUTO: 76 % — SIGNIFICANT CHANGE UP (ref 43–77)
NEUTS BAND # BLD: 3 % — SIGNIFICANT CHANGE UP (ref 0–8)
NRBC # BLD: 0 /100 WBCS — SIGNIFICANT CHANGE UP (ref 0–0)
NRBC # BLD: 0 — SIGNIFICANT CHANGE UP
NRBC # BLD: SIGNIFICANT CHANGE UP /100 WBCS (ref 0–0)
PLAT MORPH BLD: NORMAL — SIGNIFICANT CHANGE UP
PLATELET # BLD AUTO: 0 K/UL — CRITICAL LOW (ref 150–400)
PLATELET # BLD AUTO: 0 K/UL — CRITICAL LOW (ref 150–400)
POTASSIUM SERPL-MCNC: 4.6 MMOL/L — SIGNIFICANT CHANGE UP (ref 3.5–5.3)
POTASSIUM SERPL-SCNC: 4.6 MMOL/L — SIGNIFICANT CHANGE UP (ref 3.5–5.3)
PROT SERPL-MCNC: 8.5 G/DL — HIGH (ref 6–8.3)
PROTHROM AB SERPL-ACNC: 13.1 SEC — SIGNIFICANT CHANGE UP (ref 10.6–13.6)
RBC # BLD: 4.19 M/UL — LOW (ref 4.2–5.8)
RBC # BLD: 4.21 M/UL — SIGNIFICANT CHANGE UP (ref 4.2–5.8)
RBC # FLD: 12.9 % — SIGNIFICANT CHANGE UP (ref 10.3–14.5)
RBC # FLD: 13 % — SIGNIFICANT CHANGE UP (ref 10.3–14.5)
RBC BLD AUTO: SIGNIFICANT CHANGE UP
SARS-COV-2 RNA SPEC QL NAA+PROBE: SIGNIFICANT CHANGE UP
SODIUM SERPL-SCNC: 139 MMOL/L — SIGNIFICANT CHANGE UP (ref 135–145)
WBC # BLD: 6.3 K/UL — SIGNIFICANT CHANGE UP (ref 3.8–10.5)
WBC # BLD: 7.61 K/UL — SIGNIFICANT CHANGE UP (ref 3.8–10.5)
WBC # FLD AUTO: 6.3 K/UL — SIGNIFICANT CHANGE UP (ref 3.8–10.5)
WBC # FLD AUTO: 7.61 K/UL — SIGNIFICANT CHANGE UP (ref 3.8–10.5)

## 2021-09-14 PROCEDURE — 99223 1ST HOSP IP/OBS HIGH 75: CPT | Mod: GC

## 2021-09-14 RX ORDER — INFLUENZA VIRUS VACCINE 15; 15; 15; 15 UG/.5ML; UG/.5ML; UG/.5ML; UG/.5ML
0.5 SUSPENSION INTRAMUSCULAR ONCE
Refills: 0 | Status: DISCONTINUED | OUTPATIENT
Start: 2021-09-14 | End: 2021-09-18

## 2021-09-14 RX ORDER — IMMUNE GLOBULIN (HUMAN) 10 G/100ML
80 INJECTION INTRAVENOUS; SUBCUTANEOUS DAILY
Refills: 0 | Status: DISCONTINUED | OUTPATIENT
Start: 2021-09-14 | End: 2021-09-14

## 2021-09-14 RX ORDER — IMMUNE GLOBULIN (HUMAN) 10 G/100ML
80 INJECTION INTRAVENOUS; SUBCUTANEOUS EVERY 24 HOURS
Refills: 0 | Status: COMPLETED | OUTPATIENT
Start: 2021-09-14 | End: 2021-09-15

## 2021-09-14 RX ORDER — DEXAMETHASONE 0.5 MG/5ML
40 ELIXIR ORAL DAILY
Refills: 0 | Status: COMPLETED | OUTPATIENT
Start: 2021-09-14 | End: 2021-09-18

## 2021-09-14 RX ORDER — LANOLIN ALCOHOL/MO/W.PET/CERES
3 CREAM (GRAM) TOPICAL AT BEDTIME
Refills: 0 | Status: DISCONTINUED | OUTPATIENT
Start: 2021-09-14 | End: 2021-09-14

## 2021-09-14 RX ADMIN — Medication 40 MILLIGRAM(S): at 06:29

## 2021-09-14 RX ADMIN — Medication 40 MILLIGRAM(S): at 02:19

## 2021-09-14 RX ADMIN — IMMUNE GLOBULIN (HUMAN) 133.33 GRAM(S): 10 INJECTION INTRAVENOUS; SUBCUTANEOUS at 11:15

## 2021-09-14 RX ADMIN — Medication 120 MILLIGRAM(S): at 13:13

## 2021-09-14 NOTE — H&P ADULT - NSICDXPASTMEDICALHX_GEN_ALL_CORE_FT
PAST MEDICAL HISTORY:  No pertinent past medical history      PAST MEDICAL HISTORY:  Idiopathic thrombocytopenic purpura (ITP)

## 2021-09-14 NOTE — H&P ADULT - ATTENDING COMMENTS
26yo male with PMH of ITP presents to the ED with gingival bleeding, found to have platelet count of 0, admitted for acute thrombocytopenia.    admit to medicine  with active gingival bleeding - monitor closely  transfuse 1U platelets and start IV solu-medrol as per heme recs  heme/onc Dr. Moy to evaluate in AM    Agree with H&P as outlined above, edited where appropriate.

## 2021-09-14 NOTE — H&P ADULT - NSHPPHYSICALEXAM_GEN_ALL_CORE
T(C): 36.7 (09-13-21 @ 22:19), Max: 36.7 (09-13-21 @ 22:19)  HR: 81 (09-13-21 @ 22:19) (81 - 81)  BP: 120/77 (09-13-21 @ 22:19) (120/77 - 120/77)  RR: 16 (09-13-21 @ 22:19) (16 - 16)  SpO2: 100% (09-13-21 @ 22:19) (100% - 100%)    GENERAL: young male in no acute distress  EYES: sclera clear, no exudates  ENMT: gingival oozing with clot formation  NECK: supple, soft, no thyromegaly noted  LUNGS: good air entry bilaterally, clear to auscultation, symmetric breath sounds, no wheezing or rhonchi appreciated  HEART: soft S1/S2, regular rate and rhythm, no murmurs noted, no lower extremity edema  GASTROINTESTINAL: abdomen is soft, nontender, nondistended, normoactive bowel sounds, no palpable masses  INTEGUMENT: good skin turgor, no lesions noted  MUSCULOSKELETAL: trace edema in right lower extremity, calves nontender to palpation b/l  NEUROLOGIC: awake, alert, oriented x3, good muscle tone in 4 extremities, no obvious sensory deficits  PSYCHIATRIC: mood is good, affect is congruent, linear and logical thought process  HEME/LYMPH: no palpable supraclavicular nodules, no obvious ecchymosis or petechiae

## 2021-09-14 NOTE — ED ADULT NURSE REASSESSMENT NOTE - NS ED NURSE REASSESS COMMENT FT1
pt offers no new complaints.  1 unit platelets completed without adverse reaction noted.  pt given IV solumedrol as ordered.  VSS afebrile.  significant other at bedside.  call bell within reach.  pt oriented to unit.   awaiting bed assignment.

## 2021-09-14 NOTE — H&P ADULT - PROBLEM SELECTOR PLAN 1
Patient presenting to the ED with gingival bleeding, found to have platelet count of 0  - Admit to [facility]  - Patient with recent admission to [facility] from 9/3-9/8/21 and diagnosed with ITP  - Received IVIG and steroid course during hospital stay  - Start Solumedrol 40mg IVP q6h  - Ordered for 1 unit of platelets  - Hematology (Dr. Oakes) consulted, f/u recs Patient presenting to the ED with gingival bleeding, found to have platelet count of 0  - Admit to [facility]  - Patient with recent admission to [facility] from 9/3-9/8/21 and diagnosed with ITP  - Received IVIG and steroid course during hospital stay  - Start Solumedrol 40mg IVP q6h  - Ordered for 1 unit of platelets  - Monitor platelets with daily CBC   - Hematology (Dr. Oakes) consulted, f/u recs

## 2021-09-14 NOTE — ED PROVIDER NOTE - ENMT, MLM
Airway patent, Nasal mucosa clear. Mouth with normal mucosa. Throat has no vesicles, no oropharyngeal exudates and uvula is midline. MM Moist. neck supple.no  meningeal signs. Small gingival bleeding

## 2021-09-14 NOTE — PROGRESS NOTE ADULT - PROBLEM SELECTOR PLAN 3
Encourage ambulation  No chemical prophylaxis due to risk of bleeding from severe thrombocytopenia    discussed with  opal Pope via phone, Dr Moy, RN and patient

## 2021-09-14 NOTE — CONSULT NOTE ADULT - SUBJECTIVE AND OBJECTIVE BOX
All records reviewed.    HPI:  26 y/o seen by me last week for new onset symptomatic isolated thrombocytopenia, had presented w right flank pain w gross hematuria, hemorrhagic lesions on tongue/buccal mucosa, plts as low as 0, given IV Decadron 40mg x4 days- 9/2-9/6, IVIG 80grams x2 days 9/5-9/6, on 9/7, plts incr to 21k, 9/8 49k. CT renal stone hunt w mild right renal pelvic fullness.  By time of discharge, no longer w hematuria or tongue/mouth lesions  Pt was to see me in office for followup today, but adm through ER w again plts 0, normal Hgb and WBC, reports noted tongue hemorrhagic lesions again since Saturday, no other sites of bleed  Started on Solumedrol 40mg [id number] and was given one unit of plts    PAST MEDICAL & SURGICAL HISTORY:  Idiopathic thrombocytopenic purpura (ITP)    No significant past surgical history        Review of System:  see above, no fever/night sweats/anorexia/weight loss/blood in urine or stool/diarrhea/constipation/HA/sOB    MEDICATIONS  (STANDING):  dexAMETHasone  IVPB 40 milliGRAM(s) IV Intermittent daily  immune   globulin 10% (GAMMAGARD) IVPB 80 Gram(s) IV Intermittent every 24 hours  influenza   Vaccine 0.5 milliLiter(s) IntraMuscular once    MEDICATIONS  (PRN):      Allergies    No Known Allergies    Intolerances        SOCIAL HISTORY:  recreational smoke, social Etoh    FAMILY HISTORY:  FH: HTN (hypertension) (Father)    FH: type 2 diabetes (Father)    no blood dyscrasia        Vital Signs Last 24 Hrs  T(C): 36.9 (14 Sep 2021 12:37), Max: 36.9 (14 Sep 2021 12:37)  T(F): 98.5 (14 Sep 2021 12:37), Max: 98.5 (14 Sep 2021 12:37)  HR: 74 (14 Sep 2021 12:37) (64 - 81)  BP: 111/69 (14 Sep 2021 12:37) (91/54 - 120/77)  BP(mean): --  RR: 17 (14 Sep 2021 12:37) (14 - 17)  SpO2: 95% (14 Sep 2021 12:37) (95% - 100%)    PHYSICAL EXAM:      General:well developed well nourished, in no acute distress  Eyes:sclera anicteric, pupils equal and reactive to light  ENMT:buccal mucosa moist, pharynx not injected, wet purpura small lesions edge of tongue  Neck:supple, trachea midline  Lungs:clear, no wheeze/rhonchi  Cardiovascular:regular rate and rhythm, S1 S2  Abdomen:soft, nontender, no organomegaly present, bowel sounds normal  Neurological:alert and oriented x3, Cranial Nerves II-XII grossly intact  Skin:mild petechiae martha legs  Lymph Nodes:no palpable cervical/supraclavicular lymph nodes enlargements  Extremities:no cyanosis/clubbing/edema        LABS:                        12.6   7.61  )-----------( 0        ( 14 Sep 2021 07:27 )             36.6     09-14 @ 07:27  WBC7.61  RBC4.19 Hgb12.6 Hct36.6  MCV87.4  Plts0  Auto Nvddax18.0 Band-- Auto Lymph16.4 Atypical Lymph-- Reactive Lymph-- Auto Mono4.2 Auto Eos0.9 Auto Baso0.3        09-13 @ 23:36  WBC6.30  RBC4.21 Hgb12.4 Hct36.0  MCV85.5  Plts0  Auto Zavrct20.0 Band3.0 Auto Lymph35.0 Atypical Lymph-- Reactive Lymph-- Auto Mono8.0 Auto Eos2.0 Auto Baso0.0          09-14    139  |  106  |  13  ----------------------------<  122<H>  4.6   |  29  |  1.10    Ca    8.9      14 Sep 2021 07:27    TPro  8.5<H>  /  Alb  3.6  /  TBili  0.4  /  DBili  x   /  AST  56<H>  /  ALT  91<H>  /  AlkPhos  47  09-14 09-14 @ 09:29  PT13.3 INR1.14  PTT29.1  09-13 @ 23:36  PT13.1 INR1.13  PTT28.8        PERIPHERAL BLOOD SMEAR REVIEW:      RADIOLOGY & ADDITIONAL STUDIES:  < from: Xray Chest 2 Views PA/Lat (09.13.21 @ 23:00) >    EXAM:  XR CHEST PA LAT 2V                            PROCEDURE DATE:  09/13/2021          INTERPRETATION:  Clinical information: ITP. Low platelets.    TECHNIQUE: PA and lateral views of the chest.    COMPARISON: Prior chest x-ray study dated 8/6/2011.    FINDINGS: The lungs are clear. The cardiomediastinal silhouette is normal. The visualized osseous structures are unremarkable.    IMPRESSION: Clear lungs, unchanged.    < end of copied text >

## 2021-09-14 NOTE — ED PROVIDER NOTE - OBJECTIVE STATEMENT
26 yo M with hx ITP p/w having some gingival bleeding today. Pt sp recent admissoin for ITP and was dc ~ 6 days ago. pt was supposed to see heme again tomorrow but was told to return to ed if bleeding returns. No hematuria. no n/v. No blood in stool. no cough/ uri. pt NOT yet vaccinated for covid, no recent exposures. no abd pain. no agg/allev factors. no other inj or co.

## 2021-09-14 NOTE — ED PROVIDER NOTE - CHPI ED SYMPTOMS NEG
no back pain/no dizziness/no fever/no nausea/no pain/no vomiting/no chills/no decreased eating/drinking

## 2021-09-14 NOTE — H&P ADULT - HISTORY OF PRESENT ILLNESS
24yo male with PMH of ITP presents to the ED with gingival bleeding. Patient initially presented to [facility] on 9/3/2021 with flank pain and blood urine and was found to have platelet count of 2 and was diagnosed with ITP at that time. Patient was seen by hematology and was treated with IV steroids and IVIG. His platelet count slowly improved to 49 and he was discharged home on 9/8/2021. Patient was set to follow up with hematology Dr. Moy on 9/14, however he was began to notice bleeding gums and came to the emergency room to be evaluated.     In the ED:  Temp 98F | HR 81 | /77 | RR 16 | SpO2 100% on RA  Labs: H/H 12.4/36, Platelets 0, AST 54, ALT 82  CXR: wet read clear lungs  ECG:  Received: Solu-Medrol 40mg IVP x1, 1 unit Platelets  26yo male with PMH of ITP presents to the ED with gingival bleeding. Patient initially presented to [facility] on 9/3/2021 with flank pain and blood urine and was found to have platelet count of 2 and was diagnosed with ITP at that time. Patient was seen by hematology and was treated with IV steroids and IVIG. His platelet count slowly improved to 49 and he was discharged home on 9/8/2021. Patient was set to follow up with hematology Dr. Moy on 9/14, however three days ago he began to notice some bleeding in his gums. The bleeding worsened today so he decided to come to the ED to be evaluated. Patient also noted a small amount of blood after he blew his nose yesterday. He denies any hematuria or bleeding from any other sites. He denies any excessive fatigue, chest pain, palpitations, n/v.     In the ED:  Temp 98F | HR 81 | /77 | RR 16 | SpO2 100% on RA  Labs: H/H 12.4/36, Platelets 0, AST 54, ALT 82  CXR: wet read clear lungs  ECG: NSR, rate 62, nonspecific ST abnormality, QTc 399  Received: Solu-Medrol 40mg IVP x1, 1 unit Platelets  26 y/o male with PMH of ITP presents to the ED with gingival bleeding. Patient initially presented to [facility] on 9/3/2021 with flank pain and blood urine and was found to have platelet count of 2 and was diagnosed with ITP at that time. Patient was seen by hematology and was treated with IV steroids and IVIG. His platelet count slowly improved to 49 and he was discharged home on 9/8/2021. Patient was set to follow up with hematology Dr. Moy on 9/14, however three days ago he began to notice some bleeding in his gums. The bleeding worsened today so he decided to come to the ED to be evaluated. Patient also noted a small amount of blood after he blew his nose yesterday. He denies any hematuria or bleeding from any other sites. He denies any excessive fatigue, chest pain, palpitations, n/v.     In the ED:  Temp 98F | HR 81 | /77 | RR 16 | SpO2 100% on RA  Labs: H/H 12.4/36, Platelets 0, AST 54, ALT 82  CXR: clear lungs on personal read  ECG: NSR, rate 62, nonspecific ST abnormality, QTc 399  Received: Solu-Medrol 40mg IVP x1, 1 unit Platelets

## 2021-09-14 NOTE — PROGRESS NOTE ADULT - SUBJECTIVE AND OBJECTIVE BOX
Patient is a 25y old  Male who presents with a chief complaint of Thrombocytopenia (14 Sep 2021 01:33)      SUBJECTIVE / OVERNIGHT EVENTS:  No ON events. Reports mild intermittent ozzing from gums but no active bleeding. Denies melena, hematochezia, hematuria or any other bleeding. Denies, ab pain, headache, n/v, cp, sob        ADDITIONAL REVIEW OF SYSTEMS: Negative except for above    MEDICATIONS  (STANDING):  immune   globulin 10% (GAMMAGARD) IVPB 80 Gram(s) IV Intermittent every 24 hours  influenza   Vaccine 0.5 milliLiter(s) IntraMuscular once  methylPREDNISolone sodium succinate Injectable 40 milliGRAM(s) IV Push every 6 hours    MEDICATIONS  (PRN):      CAPILLARY BLOOD GLUCOSE        I&O's Summary      PHYSICAL EXAM:  Vital Signs Last 24 Hrs  T(C): 36.6 (14 Sep 2021 08:58), Max: 36.7 (13 Sep 2021 22:19)  T(F): 97.8 (14 Sep 2021 08:58), Max: 98 (13 Sep 2021 22:19)  HR: 64 (14 Sep 2021 08:58) (64 - 81)  BP: 111/73 (14 Sep 2021 08:58) (91/54 - 120/77)  BP(mean): --  RR: 16 (14 Sep 2021 08:58) (14 - 16)  SpO2: 97% (14 Sep 2021 08:58) (97% - 100%)    PHYSICAL EXAM:  GENERAL: NAD, well-developed in good spirits  HEAD:  Atraumatic, Normocephalic. no active bleeding in oropharynx   EYES:  conjunctiva and sclera clear  NECK: Supple, No JVD  CHEST/LUNG: Clear to auscultation bilaterally; No wheeze  HEART: Regular rate and rhythm; No murmurs, rubs, or gallops  ABDOMEN: Soft, Nontender, Nondistended; Bowel sounds present  EXTREMITIES:  2+ Peripheral Pulses, No clubbing, cyanosis, or edema  PSYCH: AAOx3  NEUROLOGY: non-focal  SKIN: No rashes or lesions      LABS:                        12.6   7.61  )-----------( 0        ( 14 Sep 2021 07:27 )             36.6     09-14    139  |  106  |  13  ----------------------------<  122<H>  4.6   |  29  |  1.10    Ca    8.9      14 Sep 2021 07:27    TPro  8.5<H>  /  Alb  3.6  /  TBili  0.4  /  DBili  x   /  AST  56<H>  /  ALT  91<H>  /  AlkPhos  47  09-14    PT/INR - ( 14 Sep 2021 09:29 )   PT: 13.3 sec;   INR: 1.14 ratio         PTT - ( 14 Sep 2021 09:29 )  PTT:29.1 sec            RADIOLOGY & ADDITIONAL TESTS:    Imaging Personally Reviewed:    Electrocardiogram Personally Reviewed:    COORDINATION OF CARE:  Care Discussed with Consultants/Other Providers [Y/N]:  Prior or Outpatient Records Reviewed [Y/N]:

## 2021-09-14 NOTE — H&P ADULT - NSHPREVIEWOFSYSTEMS_GEN_ALL_CORE
CONSTITUTIONAL: denies fever, chills, fatigue, weakness  HEENT: denies blurred vision, sore throat  SKIN: denies new lesions, rash  CARDIOVASCULAR: denies chest pain, chest pressure, palpitations  RESPIRATORY: denies shortness of breath, sputum production  GASTROINTESTINAL: denies nausea, vomiting, diarrhea, abdominal pain  GENITOURINARY: denies dysuria, discharge  NEUROLOGICAL: denies numbness, headache, focal weakness  MUSCULOSKELETAL: denies new joint pain, muscle aches  HEMATOLOGIC: admits gingival bleeding   LYMPHATICS: denies enlarged lymph nodes, extremity swelling  PSYCHIATRIC: denies recent changes in anxiety, depression  ENDOCRINOLOGIC: denies sweating, cold or heat intolerance

## 2021-09-14 NOTE — ED PROVIDER NOTE - PROGRESS NOTE DETAILS
Dw Dr Oakes, will have Dr SILVIO Moy see pt in AM, give 1 unit plt, Solu-medrol 40mg q6  Dr Giles colon. Zachery Skaggs, will see pt to admit.

## 2021-09-14 NOTE — CONSULT NOTE ADULT - ASSESSMENT
26 y/o man w resistent ITP, platelets 0,  first presented and adm 9/3, sx of gross hematuria/tongue hemorrhagic lesions/skin petechiae, post 4 days Decadron 40mg and 2 days IVIG 80grams, w incr of plts to 49k on discharge 9/8  Was to be seen in office today Tuesday 9/14, but readm w plts 0 again and w hemorrhagic mucosal lesions and petechiae  Started on Solumedrol 40mg [id number] and post one unit plts    -will change steroid to IV Decadron 40mg x4 days, IVIG start today 1gm/kg=80gms x  2 days, in hope of again temp raise plts, in mean time will plan for ERx Promacta for more definitive second line treatment  -discussed potential side effects of Promacta including but not limited to diarrhea/nausea/hepatic toxicity  -follow serial CBC    discussed w pt, discussed w Medicine  thank you, will follow w you

## 2021-09-14 NOTE — H&P ADULT - ASSESSMENT
26yo male with PMH of ITP presents to the ED with gingival bleeding, admitted for thrombocytopenia.  26yo male with PMH of ITP presents to the ED with gingival bleeding, found to have platelet count of 0, admitted for acute thrombocytopenia.

## 2021-09-14 NOTE — PROGRESS NOTE ADULT - PROBLEM SELECTOR PLAN 1
Patient presenting to the ED with gingival bleeding, found to have platelet count of 0  - recent admission to [facility] from 9/3-9/8/21 and diagnosed with ITP treated with VIG and steroid course during that hospital stay  - platelet still 0 after 1 unit platelet Overnight   - discussed with heme onc, Dr Moy, recommended holding off on further platelet transfusion unless acutely bleeding, hgb stable  - discussed with heme onc, rec starting IVIG  x 2 days and switch to steroids to dexamethasone IV 40mg daily x 4 days  - trend cbc  - monitor for bleeding

## 2021-09-14 NOTE — H&P ADULT - PROBLEM SELECTOR PLAN 2
Encourage ambulation  No chemical prophylaxis due to risk of bleeding from severe thrombocytopenia Likely due to ITP  - With active bleeding, monitor closely  - IV steroids and platelet transfusion as per heme recs  - Dr. Moy to see in AM

## 2021-09-15 LAB
ALBUMIN SERPL ELPH-MCNC: 3.1 G/DL — LOW (ref 3.3–5)
ALP SERPL-CCNC: 50 U/L — SIGNIFICANT CHANGE UP (ref 40–120)
ALT FLD-CCNC: 77 U/L — SIGNIFICANT CHANGE UP (ref 12–78)
ANION GAP SERPL CALC-SCNC: 3 MMOL/L — LOW (ref 5–17)
AST SERPL-CCNC: 37 U/L — SIGNIFICANT CHANGE UP (ref 15–37)
BASOPHILS # BLD AUTO: 0.02 K/UL — SIGNIFICANT CHANGE UP (ref 0–0.2)
BASOPHILS NFR BLD AUTO: 0.2 % — SIGNIFICANT CHANGE UP (ref 0–2)
BILIRUB SERPL-MCNC: 0.3 MG/DL — SIGNIFICANT CHANGE UP (ref 0.2–1.2)
BUN SERPL-MCNC: 18 MG/DL — SIGNIFICANT CHANGE UP (ref 7–23)
CALCIUM SERPL-MCNC: 8.9 MG/DL — SIGNIFICANT CHANGE UP (ref 8.5–10.1)
CHLORIDE SERPL-SCNC: 107 MMOL/L — SIGNIFICANT CHANGE UP (ref 96–108)
CO2 SERPL-SCNC: 28 MMOL/L — SIGNIFICANT CHANGE UP (ref 22–31)
COVID-19 SPIKE DOMAIN AB INTERP: POSITIVE
COVID-19 SPIKE DOMAIN ANTIBODY RESULT: 26.4 U/ML — HIGH
CREAT SERPL-MCNC: 1 MG/DL — SIGNIFICANT CHANGE UP (ref 0.5–1.3)
EOSINOPHIL # BLD AUTO: 0 K/UL — SIGNIFICANT CHANGE UP (ref 0–0.5)
EOSINOPHIL NFR BLD AUTO: 0 % — SIGNIFICANT CHANGE UP (ref 0–6)
GLUCOSE SERPL-MCNC: 115 MG/DL — HIGH (ref 70–99)
HCT VFR BLD CALC: 34.3 % — LOW (ref 39–50)
HGB BLD-MCNC: 11.9 G/DL — LOW (ref 13–17)
IMM GRANULOCYTES NFR BLD AUTO: 1.5 % — SIGNIFICANT CHANGE UP (ref 0–1.5)
LYMPHOCYTES # BLD AUTO: 1.69 K/UL — SIGNIFICANT CHANGE UP (ref 1–3.3)
LYMPHOCYTES # BLD AUTO: 13.7 % — SIGNIFICANT CHANGE UP (ref 13–44)
MCHC RBC-ENTMCNC: 30 PG — SIGNIFICANT CHANGE UP (ref 27–34)
MCHC RBC-ENTMCNC: 34.7 GM/DL — SIGNIFICANT CHANGE UP (ref 32–36)
MCV RBC AUTO: 86.4 FL — SIGNIFICANT CHANGE UP (ref 80–100)
MONOCYTES # BLD AUTO: 1.21 K/UL — HIGH (ref 0–0.9)
MONOCYTES NFR BLD AUTO: 9.8 % — SIGNIFICANT CHANGE UP (ref 2–14)
NEUTROPHILS # BLD AUTO: 9.25 K/UL — HIGH (ref 1.8–7.4)
NEUTROPHILS NFR BLD AUTO: 74.8 % — SIGNIFICANT CHANGE UP (ref 43–77)
NRBC # BLD: 0 /100 WBCS — SIGNIFICANT CHANGE UP (ref 0–0)
PLATELET # BLD AUTO: 0 K/UL — CRITICAL LOW (ref 150–400)
POTASSIUM SERPL-MCNC: 4.3 MMOL/L — SIGNIFICANT CHANGE UP (ref 3.5–5.3)
POTASSIUM SERPL-SCNC: 4.3 MMOL/L — SIGNIFICANT CHANGE UP (ref 3.5–5.3)
PROT SERPL-MCNC: 9 G/DL — HIGH (ref 6–8.3)
RBC # BLD: 3.97 M/UL — LOW (ref 4.2–5.8)
RBC # FLD: 12.7 % — SIGNIFICANT CHANGE UP (ref 10.3–14.5)
SARS-COV-2 IGG+IGM SERPL QL IA: 26.4 U/ML — HIGH
SARS-COV-2 IGG+IGM SERPL QL IA: POSITIVE
SODIUM SERPL-SCNC: 138 MMOL/L — SIGNIFICANT CHANGE UP (ref 135–145)
WBC # BLD: 12.36 K/UL — HIGH (ref 3.8–10.5)
WBC # FLD AUTO: 12.36 K/UL — HIGH (ref 3.8–10.5)

## 2021-09-15 PROCEDURE — 99233 SBSQ HOSP IP/OBS HIGH 50: CPT

## 2021-09-15 RX ADMIN — Medication 120 MILLIGRAM(S): at 05:43

## 2021-09-15 RX ADMIN — IMMUNE GLOBULIN (HUMAN) 133.33 GRAM(S): 10 INJECTION INTRAVENOUS; SUBCUTANEOUS at 10:48

## 2021-09-15 NOTE — PROGRESS NOTE ADULT - SUBJECTIVE AND OBJECTIVE BOX
Patient is a 25y old  Male who presents with a chief complaint of Thrombocytopenia (15 Sep 2021 11:46)      INTERVAL HPI/OVERNIGHT EVENTS:     MEDICATIONS  (STANDING):  dexAMETHasone  IVPB 40 milliGRAM(s) IV Intermittent daily  influenza   Vaccine 0.5 milliLiter(s) IntraMuscular once    MEDICATIONS  (PRN):      Allergies    No Known Allergies    Intolerances        REVIEW OF SYSTEMS:  CONSTITUTIONAL: No fever or chills  HEENT:  No headache, no sore throat  RESPIRATORY: No cough, wheezing, or shortness of breath  CARDIOVASCULAR: No chest pain, palpitations, or leg swelling  GASTROINTESTINAL: No abd pain, nausea, vomiting, or diarrhea  GENITOURINARY: No dysuria, frequency, or hematuria  NEUROLOGICAL: no focal weakness or dizziness  MUSCULOSKELETAL: no myalgias     Vital Signs Last 24 Hrs  T(C): 36.6 (15 Sep 2021 05:29), Max: 36.6 (15 Sep 2021 05:29)  T(F): 97.8 (15 Sep 2021 05:29), Max: 97.8 (15 Sep 2021 05:29)  HR: 62 (15 Sep 2021 05:29) (62 - 62)  BP: 115/64 (15 Sep 2021 05:29) (115/64 - 115/64)  BP(mean): --  RR: 16 (15 Sep 2021 05:29) (16 - 16)  SpO2: 98% (15 Sep 2021 05:29) (98% - 98%)    PHYSICAL EXAM:  GENERAL: NAD  HEENT:  EOMI, moist mucous membranes  CHEST/LUNG:  CTA b/l, no rales, wheezes, or rhonchi  HEART:  RRR, S1, S2  ABDOMEN:  BS+, soft, nontender, nondistended  EXTREMITIES: no edema, cyanosis, or calf tenderness  NERVOUS SYSTEM: AA&Ox3    LABS:                        11.9   12.36 )-----------( 0        ( 15 Sep 2021 07:27 )             34.3     CBC Full  -  ( 15 Sep 2021 07:27 )  WBC Count : 12.36 K/uL  Hemoglobin : 11.9 g/dL  Hematocrit : 34.3 %  Platelet Count - Automated : 0 K/uL  Mean Cell Volume : 86.4 fl  Mean Cell Hemoglobin : 30.0 pg  Mean Cell Hemoglobin Concentration : 34.7 gm/dL  Auto Neutrophil # : 9.25 K/uL  Auto Lymphocyte # : 1.69 K/uL  Auto Monocyte # : 1.21 K/uL  Auto Eosinophil # : 0.00 K/uL  Auto Basophil # : 0.02 K/uL  Auto Neutrophil % : 74.8 %  Auto Lymphocyte % : 13.7 %  Auto Monocyte % : 9.8 %  Auto Eosinophil % : 0.0 %  Auto Basophil % : 0.2 %    15 Sep 2021 07:27    138    |  107    |  18     ----------------------------<  115    4.3     |  28     |  1.00     Ca    8.9        15 Sep 2021 07:27    TPro  9.0    /  Alb  3.1    /  TBili  0.3    /  DBili  x      /  AST  37     /  ALT  77     /  AlkPhos  50     15 Sep 2021 07:27    PT/INR - ( 14 Sep 2021 09:29 )   PT: 13.3 sec;   INR: 1.14 ratio         PTT - ( 14 Sep 2021 09:29 )  PTT:29.1 sec    CAPILLARY BLOOD GLUCOSE              RADIOLOGY & ADDITIONAL TESTS:    Personally reviewed.     Consultant(s) Notes Reviewed:  [x] YES  [ ] NO    Care Discussed with [x] Consultants  [x] Patient  [ ] Family  [ ]      [ ] Other; RN  DVT ppx   Patient is a 25y old  Male who presents with a chief complaint of Thrombocytopenia (15 Sep 2021 11:46)      INTERVAL HPI/OVERNIGHT EVENTS: Pt seen and examined at bedside. Pt denies HA, dizziness, CP, SOB, cough, palpitations, abd pain, n/v/d, fevers, chills. Denies any further gum bleeding.    MEDICATIONS  (STANDING):  dexAMETHasone  IVPB 40 milliGRAM(s) IV Intermittent daily  influenza   Vaccine 0.5 milliLiter(s) IntraMuscular once    MEDICATIONS  (PRN):      Allergies    No Known Allergies    Intolerances        REVIEW OF SYSTEMS:  CONSTITUTIONAL: No fever or chills  HEENT:  No headache, no sore throat  RESPIRATORY: No cough, wheezing, or shortness of breath  CARDIOVASCULAR: No chest pain, palpitations, or leg swelling  GASTROINTESTINAL: No abd pain, nausea, vomiting, or diarrhea  GENITOURINARY: No dysuria, frequency, or hematuria  NEUROLOGICAL: no focal weakness or dizziness  MUSCULOSKELETAL: no myalgias     Vital Signs Last 24 Hrs  T(C): 36.6 (15 Sep 2021 05:29), Max: 36.6 (15 Sep 2021 05:29)  T(F): 97.8 (15 Sep 2021 05:29), Max: 97.8 (15 Sep 2021 05:29)  HR: 62 (15 Sep 2021 05:29) (62 - 62)  BP: 115/64 (15 Sep 2021 05:29) (115/64 - 115/64)  BP(mean): --  RR: 16 (15 Sep 2021 05:29) (16 - 16)  SpO2: 98% (15 Sep 2021 05:29) (98% - 98%)    PHYSICAL EXAM:  GENERAL: NAD  HEENT:  EOMI, moist mucous membranes  CHEST/LUNG:  CTA b/l, no rales, wheezes, or rhonchi  HEART:  RRR, S1, S2  ABDOMEN:  BS+, soft, nontender, nondistended  EXTREMITIES: no edema, cyanosis, or calf tenderness  NERVOUS SYSTEM: AA&Ox3    LABS:                        11.9   12.36 )-----------( 0        ( 15 Sep 2021 07:27 )             34.3     CBC Full  -  ( 15 Sep 2021 07:27 )  WBC Count : 12.36 K/uL  Hemoglobin : 11.9 g/dL  Hematocrit : 34.3 %  Platelet Count - Automated : 0 K/uL  Mean Cell Volume : 86.4 fl  Mean Cell Hemoglobin : 30.0 pg  Mean Cell Hemoglobin Concentration : 34.7 gm/dL  Auto Neutrophil # : 9.25 K/uL  Auto Lymphocyte # : 1.69 K/uL  Auto Monocyte # : 1.21 K/uL  Auto Eosinophil # : 0.00 K/uL  Auto Basophil # : 0.02 K/uL  Auto Neutrophil % : 74.8 %  Auto Lymphocyte % : 13.7 %  Auto Monocyte % : 9.8 %  Auto Eosinophil % : 0.0 %  Auto Basophil % : 0.2 %    15 Sep 2021 07:27    138    |  107    |  18     ----------------------------<  115    4.3     |  28     |  1.00     Ca    8.9        15 Sep 2021 07:27    TPro  9.0    /  Alb  3.1    /  TBili  0.3    /  DBili  x      /  AST  37     /  ALT  77     /  AlkPhos  50     15 Sep 2021 07:27    PT/INR - ( 14 Sep 2021 09:29 )   PT: 13.3 sec;   INR: 1.14 ratio         PTT - ( 14 Sep 2021 09:29 )  PTT:29.1 sec    CAPILLARY BLOOD GLUCOSE              RADIOLOGY & ADDITIONAL TESTS:    Personally reviewed.     Consultant(s) Notes Reviewed:  [x] YES  [ ] NO    Care Discussed with [x] Consultants  [x] Patient  [ ] Family  [ ]      [ ] Other; RN  DVT ppx

## 2021-09-15 NOTE — PROGRESS NOTE ADULT - PROBLEM SELECTOR PLAN 1
Patient presenting to the ED with gingival bleeding, found to have platelet count of 0  - recent admission to [facility] from 9/3-9/8/21 and diagnosed with ITP treated with VIG and steroid course during that hospital stay  - platelet still 0 after 1 unit platelet Overnight   - discussed with heme onc, Dr Moy, recommended holding off on further platelet transfusion unless acutely bleeding, hgb stable  - discussed with heme onc, rec starting IVIG  x 2 days and switch to steroids to dexamethasone IV 40mg daily x 4 days  - trend cbc  - monitor for bleeding Patient presenting to the ED with gingival bleeding, found to have platelet count of 0  - recent admission to [facility] from 9/3-9/8/21 and diagnosed with ITP treated with VIG and steroid course during that hospital stay  - platelet still 0  - discussed with heme onc, Dr Moy, recommended holding off on further platelet transfusion unless acutely bleeding, hgb stable  - discussed with heme onc, rec c/w IVIG  x 2 days and switch to steroids to dexamethasone IV 40mg daily x 4 days  - trend cbc  - monitor for bleeding

## 2021-09-15 NOTE — PROGRESS NOTE ADULT - PROBLEM SELECTOR PLAN 3
Encourage ambulation  No chemical prophylaxis due to risk of bleeding from severe thrombocytopenia    discussed with  opal Pope via phone, Dr Moy, RN and patient Encourage ambulation  No chemical prophylaxis due to risk of bleeding from severe thrombocytopenia    discussed with Dr Moy, RN and patient

## 2021-09-15 NOTE — DISCHARGE NOTE NURSING/CASE MANAGEMENT/SOCIAL WORK - NSTRANSFERBELONGINGSDISPO_GEN_A_NUR
"Chief Complaint   Patient presents with     Follow Up     Post-Op     BP 96/76 (BP Location: Left arm, Patient Position: Sitting, Cuff Size: Adult Regular)   Pulse 82   Ht 5' 8.03\" (172.8 cm)   Wt 138 lb 14.2 oz (63 kg)   BMI 21.10 kg/m    Lisa Person CMA    " with patient

## 2021-09-15 NOTE — PROGRESS NOTE ADULT - SUBJECTIVE AND OBJECTIVE BOX
All interim records and events noted.    tongue lesions healed, no new lesions or active bleeds      MEDICATIONS  (STANDING):  dexAMETHasone  IVPB 40 milliGRAM(s) IV Intermittent daily  influenza   Vaccine 0.5 milliLiter(s) IntraMuscular once    MEDICATIONS  (PRN):      Vital Signs Last 24 Hrs  T(C): 36.6 (15 Sep 2021 05:29), Max: 36.9 (14 Sep 2021 12:37)  T(F): 97.8 (15 Sep 2021 05:29), Max: 98.5 (14 Sep 2021 12:37)  HR: 62 (15 Sep 2021 05:29) (62 - 74)  BP: 115/64 (15 Sep 2021 05:29) (111/69 - 115/64)  BP(mean): --  RR: 16 (15 Sep 2021 05:29) (16 - 17)  SpO2: 98% (15 Sep 2021 05:29) (95% - 98%)    PHYSICAL EXAM  General: well developed  well nourished, in no acute distress  Head: atraumatic, normocephalic  ENT: sclera anicteric, buccal mucosa moist  Neck: supple, trachea midline  CV: S1 S2, regular rate and rhythm  Lungs: clear to auscultation, no wheezes/rhonchi  Abdomen: soft, nontender, bowel sounds present, no palpable masses  Extrem: no clubbing/cyanosis/edema  Skin: no significant increased ecchymosis/petechiae  Neuro: alert and oriented X3,  no focal deficits      LABS:             11.9   12.36 )-----------( 0        ( 09-15 @ 07:27 )             34.3                12.6   7.61  )-----------( 0        ( 09-14 @ 07:27 )             36.6                12.4   6.30  )-----------( 0        ( 09-13 @ 23:36 )             36.0       09-15    138  |  107  |  18  ----------------------------<  115<H>  4.3   |  28  |  1.00    Ca    8.9      15 Sep 2021 07:27    TPro  9.0<H>  /  Alb  3.1<L>  /  TBili  0.3  /  DBili  x   /  AST  37  /  ALT  77  /  AlkPhos  50  09-15    09-14 @ 09:29  PT13.3 INR1.14  PTT29.1  09-13 @ 23:36  PT13.1 INR1.13  PTT28.8      RADIOLOGY & ADDITIONAL STUDIES:    IMPRESSION/RECOMMENDATIONS:

## 2021-09-16 LAB
ALBUMIN SERPL ELPH-MCNC: 2.9 G/DL — LOW (ref 3.3–5)
ALP SERPL-CCNC: 47 U/L — SIGNIFICANT CHANGE UP (ref 40–120)
ALT FLD-CCNC: 87 U/L — HIGH (ref 12–78)
ANION GAP SERPL CALC-SCNC: 3 MMOL/L — LOW (ref 5–17)
AST SERPL-CCNC: 36 U/L — SIGNIFICANT CHANGE UP (ref 15–37)
BASOPHILS # BLD AUTO: 0.02 K/UL — SIGNIFICANT CHANGE UP (ref 0–0.2)
BASOPHILS NFR BLD AUTO: 0.1 % — SIGNIFICANT CHANGE UP (ref 0–2)
BILIRUB SERPL-MCNC: 0.3 MG/DL — SIGNIFICANT CHANGE UP (ref 0.2–1.2)
BUN SERPL-MCNC: 21 MG/DL — SIGNIFICANT CHANGE UP (ref 7–23)
CALCIUM SERPL-MCNC: 8.6 MG/DL — SIGNIFICANT CHANGE UP (ref 8.5–10.1)
CHLORIDE SERPL-SCNC: 105 MMOL/L — SIGNIFICANT CHANGE UP (ref 96–108)
CO2 SERPL-SCNC: 29 MMOL/L — SIGNIFICANT CHANGE UP (ref 22–31)
CREAT SERPL-MCNC: 1.1 MG/DL — SIGNIFICANT CHANGE UP (ref 0.5–1.3)
EOSINOPHIL # BLD AUTO: 0 K/UL — SIGNIFICANT CHANGE UP (ref 0–0.5)
EOSINOPHIL NFR BLD AUTO: 0 % — SIGNIFICANT CHANGE UP (ref 0–6)
GLUCOSE SERPL-MCNC: 83 MG/DL — SIGNIFICANT CHANGE UP (ref 70–99)
HCT VFR BLD CALC: 36.2 % — LOW (ref 39–50)
HGB BLD-MCNC: 12.4 G/DL — LOW (ref 13–17)
IMM GRANULOCYTES NFR BLD AUTO: 1.3 % — SIGNIFICANT CHANGE UP (ref 0–1.5)
LYMPHOCYTES # BLD AUTO: 18.7 % — SIGNIFICANT CHANGE UP (ref 13–44)
LYMPHOCYTES # BLD AUTO: 3.2 K/UL — SIGNIFICANT CHANGE UP (ref 1–3.3)
MCHC RBC-ENTMCNC: 30.2 PG — SIGNIFICANT CHANGE UP (ref 27–34)
MCHC RBC-ENTMCNC: 34.3 GM/DL — SIGNIFICANT CHANGE UP (ref 32–36)
MCV RBC AUTO: 88.3 FL — SIGNIFICANT CHANGE UP (ref 80–100)
MONOCYTES # BLD AUTO: 1.27 K/UL — HIGH (ref 0–0.9)
MONOCYTES NFR BLD AUTO: 7.4 % — SIGNIFICANT CHANGE UP (ref 2–14)
NEUTROPHILS # BLD AUTO: 12.41 K/UL — HIGH (ref 1.8–7.4)
NEUTROPHILS NFR BLD AUTO: 72.5 % — SIGNIFICANT CHANGE UP (ref 43–77)
NRBC # BLD: 0 /100 WBCS — SIGNIFICANT CHANGE UP (ref 0–0)
PLATELET # BLD AUTO: 1 K/UL — CRITICAL LOW (ref 150–400)
POTASSIUM SERPL-MCNC: 3.5 MMOL/L — SIGNIFICANT CHANGE UP (ref 3.5–5.3)
POTASSIUM SERPL-SCNC: 3.5 MMOL/L — SIGNIFICANT CHANGE UP (ref 3.5–5.3)
PROT SERPL-MCNC: 10 G/DL — HIGH (ref 6–8.3)
RBC # BLD: 4.1 M/UL — LOW (ref 4.2–5.8)
RBC # FLD: 13.1 % — SIGNIFICANT CHANGE UP (ref 10.3–14.5)
SODIUM SERPL-SCNC: 137 MMOL/L — SIGNIFICANT CHANGE UP (ref 135–145)
WBC # BLD: 17.12 K/UL — HIGH (ref 3.8–10.5)
WBC # FLD AUTO: 17.12 K/UL — HIGH (ref 3.8–10.5)

## 2021-09-16 PROCEDURE — 99233 SBSQ HOSP IP/OBS HIGH 50: CPT

## 2021-09-16 RX ADMIN — Medication 120 MILLIGRAM(S): at 05:26

## 2021-09-16 NOTE — PROGRESS NOTE ADULT - SUBJECTIVE AND OBJECTIVE BOX
Interval History:  no symptoms of bleeding    Chart reviewed and events noted;   Overnight events:    MEDICATIONS  (STANDING):  dexAMETHasone  IVPB 40 milliGRAM(s) IV Intermittent daily  influenza   Vaccine 0.5 milliLiter(s) IntraMuscular once    MEDICATIONS  (PRN):      Vital Signs Last 24 Hrs  T(C): 36.3 (16 Sep 2021 05:31), Max: 36.8 (15 Sep 2021 12:50)  T(F): 97.4 (16 Sep 2021 05:31), Max: 98.3 (15 Sep 2021 12:50)  HR: 64 (16 Sep 2021 05:31) (59 - 75)  BP: 138/81 (16 Sep 2021 05:31) (130/89 - 138/81)  BP(mean): --  RR: 18 (16 Sep 2021 05:31) (16 - 18)  SpO2: 99% (16 Sep 2021 05:31) (97% - 99%)    PHYSICAL EXAM  General: adult in NAD  HEENT: clear oropharynx, anicteric sclera, pink conjunctivae, tongue continues to improve  Neck: supple  CV: normal S1S2 with no murmur rubs or gallops  Lungs: clear to auscultation, no wheezes, no rhales  Abdomen: soft non-tender non-distended, no hepato/splenomegaly  Ext: no clubbing cyanosis or edema  Skin: no rashes and no petichiae  Neuro: alert and oriented X3 no focal deficits      LABS:  CBC Full  -  ( 16 Sep 2021 07:33 )  WBC Count : 17.12 K/uL  RBC Count : 4.10 M/uL  Hemoglobin : 12.4 g/dL  Hematocrit : 36.2 %  Platelet Count - Automated : 1 K/uL  Mean Cell Volume : 88.3 fl  Mean Cell Hemoglobin : 30.2 pg  Mean Cell Hemoglobin Concentration : 34.3 gm/dL  Auto Neutrophil # : 12.41 K/uL  Auto Lymphocyte # : 3.20 K/uL  Auto Monocyte # : 1.27 K/uL  Auto Eosinophil # : 0.00 K/uL  Auto Basophil # : 0.02 K/uL  Auto Neutrophil % : 72.5 %  Auto Lymphocyte % : 18.7 %  Auto Monocyte % : 7.4 %  Auto Eosinophil % : 0.0 %  Auto Basophil % : 0.1 %    09-16    137  |  105  |  21  ----------------------------<  83  3.5   |  29  |  1.10    Ca    8.6      16 Sep 2021 07:33    TPro  10.0<H>  /  Alb  2.9<L>  /  TBili  0.3  /  DBili  x   /  AST  36  /  ALT  87<H>  /  AlkPhos  47  09-16        fe studies      WBC trend  17.12 K/uL (09-16-21 @ 07:33)  12.36 K/uL (09-15-21 @ 07:27)  7.61 K/uL (09-14-21 @ 07:27)  6.30 K/uL (09-13-21 @ 23:36)      Hgb trend  12.4 g/dL (09-16-21 @ 07:33)  11.9 g/dL (09-15-21 @ 07:27)  12.6 g/dL (09-14-21 @ 07:27)  12.4 g/dL (09-13-21 @ 23:36)      plt trend  1 K/uL (09-16-21 @ 07:33)  0 K/uL (09-15-21 @ 07:27)  0 K/uL (09-14-21 @ 07:27)  0 K/uL (09-13-21 @ 23:36)        RADIOLOGY & ADDITIONAL STUDIES:

## 2021-09-16 NOTE — PROGRESS NOTE ADULT - PROBLEM SELECTOR PLAN 1
Patient presenting to the ED with gingival bleeding, found to have platelet count of 0  - recent admission to [facility] from 9/3-9/8/21 and diagnosed with ITP treated with VIG and steroid course during that hospital stay  - platelets improved at 1  - discussed with heme onc, Dr Moy, recommended holding off on further platelet transfusion unless acutely bleeding, hgb stable  - discussed with heme onc, rec c/w IVIG  x 2 days and dexamethasone IV 40mg daily x 4 days -- plan for rituxan infusion tomorrow morning per heme/onc  - trend cbc  - monitor for bleeding

## 2021-09-16 NOTE — PROGRESS NOTE ADULT - PROBLEM SELECTOR PLAN 3
Encourage ambulation  No chemical prophylaxis due to risk of bleeding from severe thrombocytopenia    discussed with Dr Oakes, YAMILETH and patient

## 2021-09-16 NOTE — PROGRESS NOTE ADULT - SUBJECTIVE AND OBJECTIVE BOX
Patient is a 25y old  Male who presents with a chief complaint of Thrombocytopenia (16 Sep 2021 12:38)      INTERVAL HPI/OVERNIGHT EVENTS: Pt seen and examined at bedside. denies any overt bleeding.    MEDICATIONS  (STANDING):  dexAMETHasone  IVPB 40 milliGRAM(s) IV Intermittent daily  influenza   Vaccine 0.5 milliLiter(s) IntraMuscular once    MEDICATIONS  (PRN):      Allergies    No Known Allergies    Intolerances        REVIEW OF SYSTEMS:  CONSTITUTIONAL: No fever or chills  HEENT:  No headache, no sore throat  RESPIRATORY: No cough, wheezing, or shortness of breath  CARDIOVASCULAR: No chest pain, palpitations, or leg swelling  GASTROINTESTINAL: No abd pain, nausea, vomiting, or diarrhea  GENITOURINARY: No dysuria, frequency, or hematuria  NEUROLOGICAL: no focal weakness or dizziness  MUSCULOSKELETAL: no myalgias     Vital Signs Last 24 Hrs  T(C): 36.8 (16 Sep 2021 13:14), Max: 36.8 (15 Sep 2021 19:42)  T(F): 98.3 (16 Sep 2021 13:14), Max: 98.3 (15 Sep 2021 19:42)  HR: 65 (16 Sep 2021 13:14) (59 - 65)  BP: 127/65 (16 Sep 2021 13:14) (127/65 - 138/81)  BP(mean): --  RR: 18 (16 Sep 2021 13:14) (17 - 18)  SpO2: 99% (16 Sep 2021 13:14) (98% - 99%)    PHYSICAL EXAM:  GENERAL: NAD  HEENT:  EOMI, moist mucous membranes. healing petechiae on tongue noted.  CHEST/LUNG:  CTA b/l, no rales, wheezes, or rhonchi  HEART:  RRR, S1, S2  ABDOMEN:  BS+, soft, nontender, nondistended  EXTREMITIES: no edema, cyanosis, or calf tenderness  NERVOUS SYSTEM: AA&Ox3    LABS:                        12.4   17.12 )-----------( 1        ( 16 Sep 2021 07:33 )             36.2     CBC Full  -  ( 16 Sep 2021 07:33 )  WBC Count : 17.12 K/uL  Hemoglobin : 12.4 g/dL  Hematocrit : 36.2 %  Platelet Count - Automated : 1 K/uL  Mean Cell Volume : 88.3 fl  Mean Cell Hemoglobin : 30.2 pg  Mean Cell Hemoglobin Concentration : 34.3 gm/dL  Auto Neutrophil # : 12.41 K/uL  Auto Lymphocyte # : 3.20 K/uL  Auto Monocyte # : 1.27 K/uL  Auto Eosinophil # : 0.00 K/uL  Auto Basophil # : 0.02 K/uL  Auto Neutrophil % : 72.5 %  Auto Lymphocyte % : 18.7 %  Auto Monocyte % : 7.4 %  Auto Eosinophil % : 0.0 %  Auto Basophil % : 0.1 %    16 Sep 2021 07:33    137    |  105    |  21     ----------------------------<  83     3.5     |  29     |  1.10     Ca    8.6        16 Sep 2021 07:33    TPro  10.0   /  Alb  2.9    /  TBili  0.3    /  DBili  x      /  AST  36     /  ALT  87     /  AlkPhos  47     16 Sep 2021 07:33        CAPILLARY BLOOD GLUCOSE              RADIOLOGY & ADDITIONAL TESTS:    Personally reviewed.     Consultant(s) Notes Reviewed:  [x] YES  [ ] NO    Care Discussed with [x] Consultants  [x] Patient  [ ] Family  [ ]      [ ] Other; RN  DVT ppx

## 2021-09-17 LAB
ANION GAP SERPL CALC-SCNC: 4 MMOL/L — LOW (ref 5–17)
BASOPHILS # BLD AUTO: 0.02 K/UL — SIGNIFICANT CHANGE UP (ref 0–0.2)
BASOPHILS NFR BLD AUTO: 0.2 % — SIGNIFICANT CHANGE UP (ref 0–2)
BUN SERPL-MCNC: 18 MG/DL — SIGNIFICANT CHANGE UP (ref 7–23)
CALCIUM SERPL-MCNC: 8.7 MG/DL — SIGNIFICANT CHANGE UP (ref 8.5–10.1)
CHLORIDE SERPL-SCNC: 106 MMOL/L — SIGNIFICANT CHANGE UP (ref 96–108)
CO2 SERPL-SCNC: 29 MMOL/L — SIGNIFICANT CHANGE UP (ref 22–31)
CREAT SERPL-MCNC: 1.1 MG/DL — SIGNIFICANT CHANGE UP (ref 0.5–1.3)
EOSINOPHIL # BLD AUTO: 0 K/UL — SIGNIFICANT CHANGE UP (ref 0–0.5)
EOSINOPHIL NFR BLD AUTO: 0 % — SIGNIFICANT CHANGE UP (ref 0–6)
GLUCOSE SERPL-MCNC: 142 MG/DL — HIGH (ref 70–99)
HAV IGM SER-ACNC: SIGNIFICANT CHANGE UP
HBV CORE IGM SER-ACNC: SIGNIFICANT CHANGE UP
HBV SURFACE AG SER-ACNC: SIGNIFICANT CHANGE UP
HCT VFR BLD CALC: 34.4 % — LOW (ref 39–50)
HCV AB S/CO SERPL IA: 0.34 S/CO — SIGNIFICANT CHANGE UP (ref 0–0.99)
HCV AB SERPL-IMP: SIGNIFICANT CHANGE UP
HGB BLD-MCNC: 11.9 G/DL — LOW (ref 13–17)
IMM GRANULOCYTES NFR BLD AUTO: 1.5 % — SIGNIFICANT CHANGE UP (ref 0–1.5)
LYMPHOCYTES # BLD AUTO: 1.72 K/UL — SIGNIFICANT CHANGE UP (ref 1–3.3)
LYMPHOCYTES # BLD AUTO: 14.9 % — SIGNIFICANT CHANGE UP (ref 13–44)
MCHC RBC-ENTMCNC: 30.1 PG — SIGNIFICANT CHANGE UP (ref 27–34)
MCHC RBC-ENTMCNC: 34.6 GM/DL — SIGNIFICANT CHANGE UP (ref 32–36)
MCV RBC AUTO: 87.1 FL — SIGNIFICANT CHANGE UP (ref 80–100)
MONOCYTES # BLD AUTO: 0.31 K/UL — SIGNIFICANT CHANGE UP (ref 0–0.9)
MONOCYTES NFR BLD AUTO: 2.7 % — SIGNIFICANT CHANGE UP (ref 2–14)
NEUTROPHILS # BLD AUTO: 9.36 K/UL — HIGH (ref 1.8–7.4)
NEUTROPHILS NFR BLD AUTO: 80.7 % — HIGH (ref 43–77)
NRBC # BLD: 0 /100 WBCS — SIGNIFICANT CHANGE UP (ref 0–0)
PLATELET # BLD AUTO: 1 K/UL — CRITICAL LOW (ref 150–400)
POTASSIUM SERPL-MCNC: 4.1 MMOL/L — SIGNIFICANT CHANGE UP (ref 3.5–5.3)
POTASSIUM SERPL-SCNC: 4.1 MMOL/L — SIGNIFICANT CHANGE UP (ref 3.5–5.3)
RBC # BLD: 3.95 M/UL — LOW (ref 4.2–5.8)
RBC # FLD: 13 % — SIGNIFICANT CHANGE UP (ref 10.3–14.5)
SODIUM SERPL-SCNC: 139 MMOL/L — SIGNIFICANT CHANGE UP (ref 135–145)
WBC # BLD: 11.58 K/UL — HIGH (ref 3.8–10.5)
WBC # FLD AUTO: 11.58 K/UL — HIGH (ref 3.8–10.5)

## 2021-09-17 PROCEDURE — 99233 SBSQ HOSP IP/OBS HIGH 50: CPT

## 2021-09-17 RX ORDER — ACETAMINOPHEN 500 MG
650 TABLET ORAL ONCE
Refills: 0 | Status: COMPLETED | OUTPATIENT
Start: 2021-09-17 | End: 2021-09-17

## 2021-09-17 RX ORDER — DIPHENHYDRAMINE HCL 50 MG
50 CAPSULE ORAL ONCE
Refills: 0 | Status: COMPLETED | OUTPATIENT
Start: 2021-09-17 | End: 2021-09-17

## 2021-09-17 RX ORDER — RITUXIMAB 10 MG/ML
825 INJECTION, SOLUTION INTRAVENOUS ONCE
Refills: 0 | Status: COMPLETED | OUTPATIENT
Start: 2021-09-17 | End: 2021-09-17

## 2021-09-17 RX ADMIN — Medication 120 MILLIGRAM(S): at 05:36

## 2021-09-17 RX ADMIN — Medication 650 MILLIGRAM(S): at 12:58

## 2021-09-17 RX ADMIN — Medication 50 MILLIGRAM(S): at 12:58

## 2021-09-17 RX ADMIN — RITUXIMAB 206.25 MILLIGRAM(S): 10 INJECTION, SOLUTION INTRAVENOUS at 13:18

## 2021-09-17 NOTE — PROGRESS NOTE ADULT - SUBJECTIVE AND OBJECTIVE BOX
Patient is a 25y old  Male who presents with a chief complaint of Thrombocytopenia (17 Sep 2021 12:04)      INTERVAL HPI/OVERNIGHT EVENTS: Pt seen and examined at bedside. States has mild petechiae on lips, no overt bleeding noted.    MEDICATIONS  (STANDING):  dexAMETHasone  IVPB 40 milliGRAM(s) IV Intermittent daily  influenza   Vaccine 0.5 milliLiter(s) IntraMuscular once    MEDICATIONS  (PRN):      Allergies    No Known Allergies    Intolerances        REVIEW OF SYSTEMS:  CONSTITUTIONAL: No fever or chills  HEENT:  No headache, no sore throat  RESPIRATORY: No cough, wheezing, or shortness of breath  CARDIOVASCULAR: No chest pain, palpitations, or leg swelling  GASTROINTESTINAL: No abd pain, nausea, vomiting, or diarrhea  GENITOURINARY: No dysuria, frequency, or hematuria  NEUROLOGICAL: no focal weakness or dizziness  MUSCULOSKELETAL: no myalgias     Vital Signs Last 24 Hrs  T(C): 36.9 (17 Sep 2021 14:49), Max: 37.1 (17 Sep 2021 11:22)  T(F): 98.4 (17 Sep 2021 14:49), Max: 98.8 (17 Sep 2021 11:22)  HR: 58 (17 Sep 2021 14:49) (52 - 73)  BP: 127/75 (17 Sep 2021 14:49) (122/69 - 140/69)  BP(mean): --  RR: 14 (17 Sep 2021 14:49) (14 - 16)  SpO2: 96% (17 Sep 2021 14:49) (96% - 99%)    PHYSICAL EXAM:  GENERAL: M in NAD  HEENT:  EOMI, moist mucous membranes  CHEST/LUNG:  CTA b/l, no rales, wheezes, or rhonchi  HEART:  RRR, S1, S2  ABDOMEN:  BS+, soft, nontender, nondistended  EXTREMITIES: no edema, cyanosis, or calf tenderness  NERVOUS SYSTEM: AA&Ox3    LABS:                        11.9   11.58 )-----------( 1        ( 17 Sep 2021 09:02 )             34.4     CBC Full  -  ( 17 Sep 2021 09:02 )  WBC Count : 11.58 K/uL  Hemoglobin : 11.9 g/dL  Hematocrit : 34.4 %  Platelet Count - Automated : 1 K/uL  Mean Cell Volume : 87.1 fl  Mean Cell Hemoglobin : 30.1 pg  Mean Cell Hemoglobin Concentration : 34.6 gm/dL  Auto Neutrophil # : 9.36 K/uL  Auto Lymphocyte # : 1.72 K/uL  Auto Monocyte # : 0.31 K/uL  Auto Eosinophil # : 0.00 K/uL  Auto Basophil # : 0.02 K/uL  Auto Neutrophil % : 80.7 %  Auto Lymphocyte % : 14.9 %  Auto Monocyte % : 2.7 %  Auto Eosinophil % : 0.0 %  Auto Basophil % : 0.2 %    17 Sep 2021 09:02    139    |  106    |  18     ----------------------------<  142    4.1     |  29     |  1.10     Ca    8.7        17 Sep 2021 09:02          CAPILLARY BLOOD GLUCOSE              RADIOLOGY & ADDITIONAL TESTS:    Personally reviewed.     Consultant(s) Notes Reviewed:  [x] YES  [ ] NO    Care Discussed with [x] Consultants  [x] Patient  [ ] Family  [ ]      [ ] Other; RN  DVT ppx

## 2021-09-17 NOTE — PROGRESS NOTE ADULT - SUBJECTIVE AND OBJECTIVE BOX
Interval History:  complains of mild gum bleeding  Chart reviewed and events noted;   Overnight events:    MEDICATIONS  (STANDING):  dexAMETHasone  IVPB 40 milliGRAM(s) IV Intermittent daily  influenza   Vaccine 0.5 milliLiter(s) IntraMuscular once    MEDICATIONS  (PRN):      Vital Signs Last 24 Hrs  T(C): 37.1 (17 Sep 2021 11:22), Max: 37.1 (17 Sep 2021 11:22)  T(F): 98.8 (17 Sep 2021 11:22), Max: 98.8 (17 Sep 2021 11:22)  HR: 73 (17 Sep 2021 11:22) (52 - 73)  BP: 126/83 (17 Sep 2021 11:22) (123/57 - 132/78)  BP(mean): --  RR: 15 (17 Sep 2021 11:22) (15 - 18)  SpO2: 98% (17 Sep 2021 11:22) (96% - 99%)    PHYSICAL EXAM  General: adult in NAD  HEENT: clear oropharynx, anicteric sclera, pink conjunctivae gums line with some ecchymoses  Neck: supple  CV: normal S1S2 with no murmur rubs or gallops  Lungs: clear to auscultation, no wheezes, no rhales  Abdomen: soft non-tender non-distended, no hepato/splenomegaly  Ext: no clubbing cyanosis or edema  Skin: no rashes and no petichiae  Neuro: alert and oriented X3 no focal deficits      LABS:  CBC Full  -  ( 17 Sep 2021 09:02 )  WBC Count : 11.58 K/uL  RBC Count : 3.95 M/uL  Hemoglobin : 11.9 g/dL  Hematocrit : 34.4 %  Platelet Count - Automated : 1 K/uL  Mean Cell Volume : 87.1 fl  Mean Cell Hemoglobin : 30.1 pg  Mean Cell Hemoglobin Concentration : 34.6 gm/dL  Auto Neutrophil # : 9.36 K/uL  Auto Lymphocyte # : 1.72 K/uL  Auto Monocyte # : 0.31 K/uL  Auto Eosinophil # : 0.00 K/uL  Auto Basophil # : 0.02 K/uL  Auto Neutrophil % : 80.7 %  Auto Lymphocyte % : 14.9 %  Auto Monocyte % : 2.7 %  Auto Eosinophil % : 0.0 %  Auto Basophil % : 0.2 %    09-17    139  |  106  |  18  ----------------------------<  142<H>  4.1   |  29  |  1.10    Ca    8.7      17 Sep 2021 09:02    TPro  10.0<H>  /  Alb  2.9<L>  /  TBili  0.3  /  DBili  x   /  AST  36  /  ALT  87<H>  /  AlkPhos  47  09-16        fe studies      WBC trend  11.58 K/uL (09-17-21 @ 09:02)  17.12 K/uL (09-16-21 @ 07:33)  12.36 K/uL (09-15-21 @ 07:27)      Hgb trend  11.9 g/dL (09-17-21 @ 09:02)  12.4 g/dL (09-16-21 @ 07:33)  11.9 g/dL (09-15-21 @ 07:27)      plt trend  1 K/uL (09-17-21 @ 09:02)  1 K/uL (09-16-21 @ 07:33)  0 K/uL (09-15-21 @ 07:27)        RADIOLOGY & ADDITIONAL STUDIES:

## 2021-09-17 NOTE — PROGRESS NOTE ADULT - PROBLEM SELECTOR PLAN 1
Patient presenting to the ED with gingival bleeding, found to have platelet count of 0  - recent admission to [facility] from 9/3-9/8/21 and diagnosed with ITP treated with VIG and steroid course during that hospital stay  - platelets still at 1  - discussed with heme onc, rec c/w IVIG  x 2 days and dexamethasone IV 40mg daily x 4 days -- plan for rituxan infusion today morning per heme/onc -- will also get 1 unit platelets -- DC planning once Plt are >20K  - trend cbc  - monitor for bleeding

## 2021-09-18 VITALS
RESPIRATION RATE: 16 BRPM | DIASTOLIC BLOOD PRESSURE: 83 MMHG | HEART RATE: 78 BPM | OXYGEN SATURATION: 98 % | TEMPERATURE: 98 F | SYSTOLIC BLOOD PRESSURE: 145 MMHG

## 2021-09-18 LAB
ANION GAP SERPL CALC-SCNC: 6 MMOL/L — SIGNIFICANT CHANGE UP (ref 5–17)
BASOPHILS # BLD AUTO: 0.02 K/UL — SIGNIFICANT CHANGE UP (ref 0–0.2)
BASOPHILS NFR BLD AUTO: 0.2 % — SIGNIFICANT CHANGE UP (ref 0–2)
BUN SERPL-MCNC: 16 MG/DL — SIGNIFICANT CHANGE UP (ref 7–23)
CALCIUM SERPL-MCNC: 8.5 MG/DL — SIGNIFICANT CHANGE UP (ref 8.5–10.1)
CHLORIDE SERPL-SCNC: 107 MMOL/L — SIGNIFICANT CHANGE UP (ref 96–108)
CO2 SERPL-SCNC: 27 MMOL/L — SIGNIFICANT CHANGE UP (ref 22–31)
CREAT SERPL-MCNC: 0.96 MG/DL — SIGNIFICANT CHANGE UP (ref 0.5–1.3)
EOSINOPHIL # BLD AUTO: 0.01 K/UL — SIGNIFICANT CHANGE UP (ref 0–0.5)
EOSINOPHIL NFR BLD AUTO: 0.1 % — SIGNIFICANT CHANGE UP (ref 0–6)
GLUCOSE SERPL-MCNC: 86 MG/DL — SIGNIFICANT CHANGE UP (ref 70–99)
HCT VFR BLD CALC: 35.1 % — LOW (ref 39–50)
HGB BLD-MCNC: 12.1 G/DL — LOW (ref 13–17)
IMM GRANULOCYTES NFR BLD AUTO: 1.5 % — SIGNIFICANT CHANGE UP (ref 0–1.5)
INR BLD: 1.07 RATIO — SIGNIFICANT CHANGE UP (ref 0.88–1.16)
LYMPHOCYTES # BLD AUTO: 17.9 % — SIGNIFICANT CHANGE UP (ref 13–44)
LYMPHOCYTES # BLD AUTO: 2.01 K/UL — SIGNIFICANT CHANGE UP (ref 1–3.3)
MCHC RBC-ENTMCNC: 29.7 PG — SIGNIFICANT CHANGE UP (ref 27–34)
MCHC RBC-ENTMCNC: 34.5 GM/DL — SIGNIFICANT CHANGE UP (ref 32–36)
MCV RBC AUTO: 86 FL — SIGNIFICANT CHANGE UP (ref 80–100)
MONOCYTES # BLD AUTO: 0.95 K/UL — HIGH (ref 0–0.9)
MONOCYTES NFR BLD AUTO: 8.5 % — SIGNIFICANT CHANGE UP (ref 2–14)
NEUTROPHILS # BLD AUTO: 8.04 K/UL — HIGH (ref 1.8–7.4)
NEUTROPHILS NFR BLD AUTO: 71.8 % — SIGNIFICANT CHANGE UP (ref 43–77)
NRBC # BLD: 0 /100 WBCS — SIGNIFICANT CHANGE UP (ref 0–0)
PLATELET # BLD AUTO: 9 K/UL — CRITICAL LOW (ref 150–400)
POTASSIUM SERPL-MCNC: 3.4 MMOL/L — LOW (ref 3.5–5.3)
POTASSIUM SERPL-SCNC: 3.4 MMOL/L — LOW (ref 3.5–5.3)
PROTHROM AB SERPL-ACNC: 12.5 SEC — SIGNIFICANT CHANGE UP (ref 10.6–13.6)
RBC # BLD: 4.08 M/UL — LOW (ref 4.2–5.8)
RBC # FLD: 13.2 % — SIGNIFICANT CHANGE UP (ref 10.3–14.5)
SODIUM SERPL-SCNC: 140 MMOL/L — SIGNIFICANT CHANGE UP (ref 135–145)
WBC # BLD: 11.2 K/UL — HIGH (ref 3.8–10.5)
WBC # FLD AUTO: 11.2 K/UL — HIGH (ref 3.8–10.5)

## 2021-09-18 PROCEDURE — P9037: CPT

## 2021-09-18 PROCEDURE — U0003: CPT

## 2021-09-18 PROCEDURE — 99239 HOSP IP/OBS DSCHRG MGMT >30: CPT

## 2021-09-18 PROCEDURE — 86900 BLOOD TYPING SEROLOGIC ABO: CPT

## 2021-09-18 PROCEDURE — 86850 RBC ANTIBODY SCREEN: CPT

## 2021-09-18 PROCEDURE — 80053 COMPREHEN METABOLIC PANEL: CPT

## 2021-09-18 PROCEDURE — 85025 COMPLETE CBC W/AUTO DIFF WBC: CPT

## 2021-09-18 PROCEDURE — 71046 X-RAY EXAM CHEST 2 VIEWS: CPT

## 2021-09-18 PROCEDURE — 36430 TRANSFUSION BLD/BLD COMPNT: CPT

## 2021-09-18 PROCEDURE — 86769 SARS-COV-2 COVID-19 ANTIBODY: CPT

## 2021-09-18 PROCEDURE — 85610 PROTHROMBIN TIME: CPT

## 2021-09-18 PROCEDURE — 36415 COLL VENOUS BLD VENIPUNCTURE: CPT

## 2021-09-18 PROCEDURE — 80048 BASIC METABOLIC PNL TOTAL CA: CPT

## 2021-09-18 PROCEDURE — P9100: CPT

## 2021-09-18 PROCEDURE — 80074 ACUTE HEPATITIS PANEL: CPT

## 2021-09-18 PROCEDURE — 85730 THROMBOPLASTIN TIME PARTIAL: CPT

## 2021-09-18 PROCEDURE — U0005: CPT

## 2021-09-18 PROCEDURE — 93005 ELECTROCARDIOGRAM TRACING: CPT

## 2021-09-18 PROCEDURE — 99285 EMERGENCY DEPT VISIT HI MDM: CPT

## 2021-09-18 PROCEDURE — 86901 BLOOD TYPING SEROLOGIC RH(D): CPT

## 2021-09-18 RX ORDER — POTASSIUM CHLORIDE 20 MEQ
40 PACKET (EA) ORAL ONCE
Refills: 0 | Status: COMPLETED | OUTPATIENT
Start: 2021-09-18 | End: 2021-09-18

## 2021-09-18 RX ADMIN — Medication 120 MILLIGRAM(S): at 07:19

## 2021-09-18 RX ADMIN — Medication 40 MILLIEQUIVALENT(S): at 11:15

## 2021-09-18 NOTE — PROGRESS NOTE ADULT - PROBLEM SELECTOR PROBLEM 2
Severe thrombocytopenia

## 2021-09-18 NOTE — PROGRESS NOTE ADULT - ASSESSMENT
24yo male with PMH of ITP presents to the ED with gingival bleeding, found to have platelet count of 0, admitted for acute thrombocytopenia.
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 Hopefully plts start incr tomorrow if similiar to last time, otherwise may need to try Rituxan  -Promacta prescription ERx'd yesterday but prescription coverage needs to be worked out, spoke w office and contacting pt  -follow serial CBC and signs of bleeds    tolerated rituxan without complication   platelet count 9K  patient considering signing out AMA  discussed risks of bleeding  if leaves would discharge on prednisone 60mg po daily and would need to be seen in office early this week
24 y/o man w resistent ITP, platelets 0,  first presented and adm 9/3, sx of gross hematuria/tongue hemorrhagic lesions/skin petechiae, post 4 days Decadron 40mg and 2 days IVIG 80grams, w incr of plts to 49k on discharge 9/8  Was to be seen in office today Tuesday 9/14, but readm w plts 0 again and w hemorrhagic mucosal lesions and petechiae  Started on Solumedrol 40mg [id number] and post one unit plts    -D#2/2 IVIG, 80gms, tolerated first dose well  -D#2/4 IV decadron 40mg, tolerating well  -plts still 0 but tongue lesions healed. Hopefully plts start incr tomorrow if similiar to last time, otherwise may need to try Rituxan  -Promacta prescription ERx'd yesterday but prescription coverage needs to be worked out, spoke w office and contacting pt  -follow serial CBC and signs of bleeds
24yo male with PMH of ITP presents to the ED with gingival bleeding, found to have platelet count of 0, admitted for acute thrombocytopenia.
26 y/o man w resistent ITP, platelets 0,  first presented and adm 9/3, sx of gross hematuria/tongue hemorrhagic lesions/skin petechiae, post 4 days Decadron 40mg and 2 days IVIG 80grams, w incr of plts to 49k on discharge 9/8  Was to be seen in office today Tuesday 9/14, but readm w plts 0 again and w hemorrhagic mucosal lesions and petechiae  Started on Solumedrol 40mg [id number] and post one unit plts    -D#2/2 IVIG, 80gms, tolerated first dose well  -D#2/4 IV decadron 40mg, tolerating well  -plts still 0 but tongue lesions healed. Hopefully plts start incr tomorrow if similiar to last time, otherwise may need to try Rituxan  -Promacta prescription ERx'd yesterday but prescription coverage needs to be worked out, spoke w office and contacting pt  -follow serial CBC and signs of bleeds    platelet count 1 today without visible bleeding  to continue decadron  to arrange for rituxan for am
26yo male with PMH of ITP presents to the ED with gingival bleeding, found to have platelet count of 0, admitted for acute thrombocytopenia.
26yo male with PMH of ITP presents to the ED with gingival bleeding, found to have platelet count of 0, admitted for acute thrombocytopenia.
26 y/o man w resistent ITP, platelets 0,  first presented and adm 9/3, sx of gross hematuria/tongue hemorrhagic lesions/skin petechiae, post 4 days Decadron 40mg and 2 days IVIG 80grams, w incr of plts to 49k on discharge 9/8  Was to be seen in office today Tuesday 9/14, but readm w plts 0 again and w hemorrhagic mucosal lesions and petechiae  Started on Solumedrol 40mg [id number] and post one unit plts    -D#2/2 IVIG, 80gms, tolerated first dose well  -D#2/4 IV decadron 40mg, tolerating well  -plts still 0 but tongue lesions healed. Hopefully plts start incr tomorrow if similiar to last time, otherwise may need to try Rituxan  -Promacta prescription ERx'd yesterday but prescription coverage needs to be worked out, spoke w office and contacting pt  -follow serial CBC and signs of bleeds    platelet count 1 today without visible bleeding  to continue decadron  to receive 1 unit of platelets and rituxan 825mg IV  follow CBC
26yo male with PMH of ITP presents to the ED with gingival bleeding, found to have platelet count of 0, admitted for acute thrombocytopenia.

## 2021-09-18 NOTE — CHART NOTE - NSCHARTNOTEFT_GEN_A_CORE
Had a lengthy discussion with patient at the bedside. Patient wishes to leave at this time. The patient demonstrates adequate understanding that they are leaving against medical advice which may lead to injury, permanent disability and/or death. Despite this counseling, the patient does not wish to remain hospitalized for further evaluation and treatment. The patient currently demonstrates adequate understanding of the discussed risks. The patient is awake, alert and demonstrates competence to make medical decisions. The patient demonstrates understanding that they may return at any time to ED. Discussed the importance of close, prompt medical follow-up.  Also present at bedside for discussions: the patient's RN.     Vital Signs Last 24 Hrs  T(C): 36.9 (18 Sep 2021 12:39), Max: 37.1 (17 Sep 2021 15:54)  T(F): 98.5 (18 Sep 2021 12:39), Max: 98.8 (17 Sep 2021 15:54)  HR: 78 (18 Sep 2021 12:39) (48 - 78)  BP: 145/83 (18 Sep 2021 12:39) (114/72 - 145/83)  BP(mean): --  RR: 16 (18 Sep 2021 12:39) (14 - 16)  SpO2: 98% (18 Sep 2021 12:39) (95% - 98%)

## 2021-09-18 NOTE — PROGRESS NOTE ADULT - PROBLEM SELECTOR PLAN 1
Patient presenting to the ED with gingival bleeding, found to have platelet count of 0  - recent admission to [facility] from 9/3-9/8/21 and diagnosed with ITP treated with VIG and steroid course during that hospital stay  - platelets still at 1  - discussed with heme onc, rec c/w IVIG  x 2 days and dexamethasone IV 40mg daily x 4 days -- plan for rituxan infusion today morning per heme/onc -- will also get 1 unit platelets -- DC planning once Plt are >20K  - trend cbc  - monitor for bleeding Patient presenting to the ED with gingival bleeding, found to have platelet count of 0  - recent admission to [facility] from 9/3-9/8/21 and diagnosed with ITP treated with VIG and steroid course during that hospital stay  - platelets still at 1  - discussed with [facility], rec c/w IVIG  x 2 days and dexamethasone IV 40mg daily x 4 days -- s/p rituxan infusion yesterday morning per heme/onc -- along with 1 unit platelets -- plt 9K this AM, DC planning once Plt are >20K, although pt is considering leaving AMA today. He verbalized understanding of risk of disability or death by doing so. d/w heme/onc, will send prednisone 60mg daily until wednesday (when he should see heme/onc as outpt) if he does leave.  - trend cbc  - monitor for bleeding

## 2021-09-18 NOTE — PROGRESS NOTE ADULT - PROBLEM SELECTOR PLAN 2
Likely due to ITP  - treatment as above  - no active bleeding, hgb stable,   - monitor cbc

## 2021-09-18 NOTE — PROVIDER CONTACT NOTE (CRITICAL VALUE NOTIFICATION) - ACTION/TREATMENT ORDERED:
MD made aware of critical value "Platelets - 0 - rare seen on smear review"; No new orders at this time.
repeat lab
continue to monitor.
pt to get rituxan infusion today per dr argueta

## 2021-09-18 NOTE — PROGRESS NOTE ADULT - SUBJECTIVE AND OBJECTIVE BOX
Patient is a 25y old  Male who presents with a chief complaint of Thrombocytopenia (17 Sep 2021 15:15)      INTERVAL HPI/OVERNIGHT EVENTS:    MEDICATIONS  (STANDING):  influenza   Vaccine 0.5 milliLiter(s) IntraMuscular once  potassium chloride    Tablet ER 40 milliEquivalent(s) Oral once    MEDICATIONS  (PRN):      Allergies    No Known Allergies    Intolerances        REVIEW OF SYSTEMS:  CONSTITUTIONAL: No fever or chills  HEENT:  No headache, no sore throat  RESPIRATORY: No cough, wheezing, or shortness of breath  CARDIOVASCULAR: No chest pain, palpitations, or leg swelling  GASTROINTESTINAL: No abd pain, nausea, vomiting, or diarrhea  GENITOURINARY: No dysuria, frequency, or hematuria  NEUROLOGICAL: no focal weakness or dizziness  MUSCULOSKELETAL: no myalgias     Vital Signs Last 24 Hrs  T(C): 36.5 (18 Sep 2021 05:38), Max: 37.1 (17 Sep 2021 11:22)  T(F): 97.7 (18 Sep 2021 05:38), Max: 98.8 (17 Sep 2021 11:22)  HR: 48 (18 Sep 2021 05:38) (48 - 77)  BP: 127/79 (18 Sep 2021 05:38) (114/72 - 140/69)  BP(mean): --  RR: 15 (18 Sep 2021 05:38) (14 - 16)  SpO2: 98% (18 Sep 2021 05:38) (95% - 98%)    PHYSICAL EXAM:  GENERAL: NAD  HEENT:  EOMI, moist mucous membranes  CHEST/LUNG:  CTA b/l, no rales, wheezes, or rhonchi  HEART:  RRR, S1, S2  ABDOMEN:  BS+, soft, nontender, nondistended  EXTREMITIES: no edema, cyanosis, or calf tenderness  NERVOUS SYSTEM: AA&Ox3    LABS:    CBC Full  -  ( 17 Sep 2021 09:02 )  WBC Count : 11.58 K/uL  Hemoglobin : 11.9 g/dL  Hematocrit : 34.4 %  Platelet Count - Automated : 1 K/uL  Mean Cell Volume : 87.1 fl  Mean Cell Hemoglobin : 30.1 pg  Mean Cell Hemoglobin Concentration : 34.6 gm/dL  Auto Neutrophil # : 9.36 K/uL  Auto Lymphocyte # : 1.72 K/uL  Auto Monocyte # : 0.31 K/uL  Auto Eosinophil # : 0.00 K/uL  Auto Basophil # : 0.02 K/uL  Auto Neutrophil % : 80.7 %  Auto Lymphocyte % : 14.9 %  Auto Monocyte % : 2.7 %  Auto Eosinophil % : 0.0 %  Auto Basophil % : 0.2 %    18 Sep 2021 08:53    140    |  107    |  16     ----------------------------<  86     3.4     |  27     |  0.96     Ca    8.5        18 Sep 2021 08:53      PT/INR - ( 18 Sep 2021 08:53 )   PT: 12.5 sec;   INR: 1.07 ratio             CAPILLARY BLOOD GLUCOSE              RADIOLOGY & ADDITIONAL TESTS:    Personally reviewed.     Consultant(s) Notes Reviewed:  [x] YES  [ ] NO    Care Discussed with [x] Consultants  [x] Patient  [ ] Family  [ ]      [ ] Other; RN  DVT ppx   Patient is a 25y old  Male who presents with a chief complaint of Thrombocytopenia (17 Sep 2021 15:15)      INTERVAL HPI/OVERNIGHT EVENTS: Pt seen and examined at bedside. Denies further bleeding. Wants to leave AMA.    MEDICATIONS  (STANDING):  influenza   Vaccine 0.5 milliLiter(s) IntraMuscular once  potassium chloride    Tablet ER 40 milliEquivalent(s) Oral once    MEDICATIONS  (PRN):      Allergies    No Known Allergies    Intolerances        REVIEW OF SYSTEMS:  CONSTITUTIONAL: No fever or chills  HEENT:  No headache, no sore throat  RESPIRATORY: No cough, wheezing, or shortness of breath  CARDIOVASCULAR: No chest pain, palpitations, or leg swelling  GASTROINTESTINAL: No abd pain, nausea, vomiting, or diarrhea  GENITOURINARY: No dysuria, frequency, or hematuria  NEUROLOGICAL: no focal weakness or dizziness  MUSCULOSKELETAL: no myalgias     Vital Signs Last 24 Hrs  T(C): 36.5 (18 Sep 2021 05:38), Max: 37.1 (17 Sep 2021 11:22)  T(F): 97.7 (18 Sep 2021 05:38), Max: 98.8 (17 Sep 2021 11:22)  HR: 48 (18 Sep 2021 05:38) (48 - 77)  BP: 127/79 (18 Sep 2021 05:38) (114/72 - 140/69)  BP(mean): --  RR: 15 (18 Sep 2021 05:38) (14 - 16)  SpO2: 98% (18 Sep 2021 05:38) (95% - 98%)    PHYSICAL EXAM:  GENERAL: NAD  HEENT:  EOMI, moist mucous membranes  CHEST/LUNG:  CTA b/l, no rales, wheezes, or rhonchi  HEART:  RRR, S1, S2  ABDOMEN:  BS+, soft, nontender, nondistended  EXTREMITIES: no edema, cyanosis, or calf tenderness  NERVOUS SYSTEM: AA&Ox3    LABS:    CBC Full  -  ( 17 Sep 2021 09:02 )  WBC Count : 11.58 K/uL  Hemoglobin : 11.9 g/dL  Hematocrit : 34.4 %  Platelet Count - Automated : 1 K/uL  Mean Cell Volume : 87.1 fl  Mean Cell Hemoglobin : 30.1 pg  Mean Cell Hemoglobin Concentration : 34.6 gm/dL  Auto Neutrophil # : 9.36 K/uL  Auto Lymphocyte # : 1.72 K/uL  Auto Monocyte # : 0.31 K/uL  Auto Eosinophil # : 0.00 K/uL  Auto Basophil # : 0.02 K/uL  Auto Neutrophil % : 80.7 %  Auto Lymphocyte % : 14.9 %  Auto Monocyte % : 2.7 %  Auto Eosinophil % : 0.0 %  Auto Basophil % : 0.2 %    18 Sep 2021 08:53    140    |  107    |  16     ----------------------------<  86     3.4     |  27     |  0.96     Ca    8.5        18 Sep 2021 08:53      PT/INR - ( 18 Sep 2021 08:53 )   PT: 12.5 sec;   INR: 1.07 ratio             CAPILLARY BLOOD GLUCOSE              RADIOLOGY & ADDITIONAL TESTS:    Personally reviewed.     Consultant(s) Notes Reviewed:  [x] YES  [ ] NO    Care Discussed with [x] Consultants  [x] Patient  [ ] Family  [ ]      [ ] Other; RN  DVT ppx

## 2021-09-18 NOTE — PROGRESS NOTE ADULT - SUBJECTIVE AND OBJECTIVE BOX
Interval History:  no further bleeding wants to go home. thinking about signing out AMA  Chart reviewed and events noted;   Overnight events:    MEDICATIONS  (STANDING):  influenza   Vaccine 0.5 milliLiter(s) IntraMuscular once    MEDICATIONS  (PRN):      Vital Signs Last 24 Hrs  T(C): 36.5 (18 Sep 2021 05:38), Max: 37.1 (17 Sep 2021 15:54)  T(F): 97.7 (18 Sep 2021 05:38), Max: 98.8 (17 Sep 2021 15:54)  HR: 48 (18 Sep 2021 05:38) (48 - 77)  BP: 127/79 (18 Sep 2021 05:38) (114/72 - 133/70)  BP(mean): --  RR: 15 (18 Sep 2021 05:38) (14 - 15)  SpO2: 98% (18 Sep 2021 05:38) (95% - 98%)    PHYSICAL EXAM  General: adult in NAD  HEENT: clear oropharynx, anicteric sclera, pink conjunctivae  Neck: supple  CV: normal S1S2 with no murmur rubs or gallops  Lungs: clear to auscultation, no wheezes, no rhales  Abdomen: soft non-tender non-distended, no hepato/splenomegaly  Ext: no clubbing cyanosis or edema  Skin: no rashes and no petichiae  Neuro: alert and oriented X3 no focal deficits      LABS:  CBC Full  -  ( 18 Sep 2021 08:53 )  WBC Count : 11.20 K/uL  RBC Count : 4.08 M/uL  Hemoglobin : 12.1 g/dL  Hematocrit : 35.1 %  Platelet Count - Automated : 9 K/uL  Mean Cell Volume : 86.0 fl  Mean Cell Hemoglobin : 29.7 pg  Mean Cell Hemoglobin Concentration : 34.5 gm/dL  Auto Neutrophil # : 8.04 K/uL  Auto Lymphocyte # : 2.01 K/uL  Auto Monocyte # : 0.95 K/uL  Auto Eosinophil # : 0.01 K/uL  Auto Basophil # : 0.02 K/uL  Auto Neutrophil % : 71.8 %  Auto Lymphocyte % : 17.9 %  Auto Monocyte % : 8.5 %  Auto Eosinophil % : 0.1 %  Auto Basophil % : 0.2 %    09-18    140  |  107  |  16  ----------------------------<  86  3.4<L>   |  27  |  0.96    Ca    8.5      18 Sep 2021 08:53      PT/INR - ( 18 Sep 2021 08:53 )   PT: 12.5 sec;   INR: 1.07 ratio             fe studies      WBC trend  11.20 K/uL (09-18-21 @ 08:53)  11.58 K/uL (09-17-21 @ 09:02)  17.12 K/uL (09-16-21 @ 07:33)      Hgb trend  12.1 g/dL (09-18-21 @ 08:53)  11.9 g/dL (09-17-21 @ 09:02)  12.4 g/dL (09-16-21 @ 07:33)      plt trend  9 K/uL (09-18-21 @ 08:53)  1 K/uL (09-17-21 @ 09:02)  1 K/uL (09-16-21 @ 07:33)        RADIOLOGY & ADDITIONAL STUDIES:

## 2021-09-24 ENCOUNTER — INPATIENT (INPATIENT)
Facility: HOSPITAL | Age: 26
LOS: 3 days | Discharge: HOME | DRG: 813 | End: 2021-09-28
Attending: INTERNAL MEDICINE | Admitting: STUDENT IN AN ORGANIZED HEALTH CARE EDUCATION/TRAINING PROGRAM
Payer: COMMERCIAL

## 2021-09-24 VITALS
OXYGEN SATURATION: 100 % | HEIGHT: 73.5 IN | HEART RATE: 94 BPM | WEIGHT: 220.02 LBS | RESPIRATION RATE: 16 BRPM | SYSTOLIC BLOOD PRESSURE: 127 MMHG | TEMPERATURE: 98 F | DIASTOLIC BLOOD PRESSURE: 79 MMHG

## 2021-09-24 DIAGNOSIS — D69.3 IMMUNE THROMBOCYTOPENIC PURPURA: ICD-10-CM

## 2021-09-24 DIAGNOSIS — Z29.9 ENCOUNTER FOR PROPHYLACTIC MEASURES, UNSPECIFIED: ICD-10-CM

## 2021-09-24 DIAGNOSIS — D69.6 THROMBOCYTOPENIA, UNSPECIFIED: ICD-10-CM

## 2021-09-24 LAB
ALBUMIN SERPL ELPH-MCNC: 3.3 G/DL — SIGNIFICANT CHANGE UP (ref 3.3–5)
ALP SERPL-CCNC: 41 U/L — SIGNIFICANT CHANGE UP (ref 40–120)
ALT FLD-CCNC: 58 U/L — SIGNIFICANT CHANGE UP (ref 12–78)
ANION GAP SERPL CALC-SCNC: 6 MMOL/L — SIGNIFICANT CHANGE UP (ref 5–17)
APTT BLD: 28.2 SEC — SIGNIFICANT CHANGE UP (ref 27.5–35.5)
AST SERPL-CCNC: 31 U/L — SIGNIFICANT CHANGE UP (ref 15–37)
BASOPHILS # BLD AUTO: 0 K/UL — SIGNIFICANT CHANGE UP (ref 0–0.2)
BASOPHILS NFR BLD AUTO: 0 % — SIGNIFICANT CHANGE UP (ref 0–2)
BILIRUB SERPL-MCNC: 0.3 MG/DL — SIGNIFICANT CHANGE UP (ref 0.2–1.2)
BUN SERPL-MCNC: 16 MG/DL — SIGNIFICANT CHANGE UP (ref 7–23)
CALCIUM SERPL-MCNC: 8.7 MG/DL — SIGNIFICANT CHANGE UP (ref 8.5–10.1)
CHLORIDE SERPL-SCNC: 101 MMOL/L — SIGNIFICANT CHANGE UP (ref 96–108)
CO2 SERPL-SCNC: 27 MMOL/L — SIGNIFICANT CHANGE UP (ref 22–31)
CREAT SERPL-MCNC: 1.1 MG/DL — SIGNIFICANT CHANGE UP (ref 0.5–1.3)
EOSINOPHIL # BLD AUTO: 0.28 K/UL — SIGNIFICANT CHANGE UP (ref 0–0.5)
EOSINOPHIL NFR BLD AUTO: 4 % — SIGNIFICANT CHANGE UP (ref 0–6)
GLUCOSE SERPL-MCNC: 127 MG/DL — HIGH (ref 70–99)
HCT VFR BLD CALC: 38.7 % — LOW (ref 39–50)
HGB BLD-MCNC: 13.1 G/DL — SIGNIFICANT CHANGE UP (ref 13–17)
INR BLD: 1.08 RATIO — SIGNIFICANT CHANGE UP (ref 0.88–1.16)
LYMPHOCYTES # BLD AUTO: 1.8 K/UL — SIGNIFICANT CHANGE UP (ref 1–3.3)
LYMPHOCYTES # BLD AUTO: 26 % — SIGNIFICANT CHANGE UP (ref 13–44)
MCHC RBC-ENTMCNC: 29.4 PG — SIGNIFICANT CHANGE UP (ref 27–34)
MCHC RBC-ENTMCNC: 33.9 GM/DL — SIGNIFICANT CHANGE UP (ref 32–36)
MCV RBC AUTO: 87 FL — SIGNIFICANT CHANGE UP (ref 80–100)
MONOCYTES # BLD AUTO: 1.04 K/UL — HIGH (ref 0–0.9)
MONOCYTES NFR BLD AUTO: 15 % — HIGH (ref 2–14)
NEUTROPHILS # BLD AUTO: 3.54 K/UL — SIGNIFICANT CHANGE UP (ref 1.8–7.4)
NEUTROPHILS NFR BLD AUTO: 50 % — SIGNIFICANT CHANGE UP (ref 43–77)
NRBC # BLD: SIGNIFICANT CHANGE UP /100 WBCS (ref 0–0)
PLATELET # BLD AUTO: 0 K/UL — CRITICAL LOW (ref 150–400)
POTASSIUM SERPL-MCNC: 3.8 MMOL/L — SIGNIFICANT CHANGE UP (ref 3.5–5.3)
POTASSIUM SERPL-SCNC: 3.8 MMOL/L — SIGNIFICANT CHANGE UP (ref 3.5–5.3)
PROT SERPL-MCNC: 8.6 G/DL — HIGH (ref 6–8.3)
PROTHROM AB SERPL-ACNC: 12.6 SEC — SIGNIFICANT CHANGE UP (ref 10.6–13.6)
RBC # BLD: 4.45 M/UL — SIGNIFICANT CHANGE UP (ref 4.2–5.8)
RBC # FLD: 13.2 % — SIGNIFICANT CHANGE UP (ref 10.3–14.5)
SARS-COV-2 RNA SPEC QL NAA+PROBE: SIGNIFICANT CHANGE UP
SODIUM SERPL-SCNC: 134 MMOL/L — LOW (ref 135–145)
WBC # BLD: 6.94 K/UL — SIGNIFICANT CHANGE UP (ref 3.8–10.5)
WBC # FLD AUTO: 6.94 K/UL — SIGNIFICANT CHANGE UP (ref 3.8–10.5)

## 2021-09-24 PROCEDURE — 99285 EMERGENCY DEPT VISIT HI MDM: CPT

## 2021-09-24 PROCEDURE — 70450 CT HEAD/BRAIN W/O DYE: CPT | Mod: 26,MA

## 2021-09-24 RX ORDER — DEXAMETHASONE 0.5 MG/5ML
40 ELIXIR ORAL DAILY
Refills: 0 | Status: DISCONTINUED | OUTPATIENT
Start: 2021-09-24 | End: 2021-09-27

## 2021-09-24 RX ORDER — ACETAMINOPHEN 500 MG
650 TABLET ORAL EVERY 6 HOURS
Refills: 0 | Status: DISCONTINUED | OUTPATIENT
Start: 2021-09-24 | End: 2021-09-28

## 2021-09-24 RX ORDER — LANOLIN ALCOHOL/MO/W.PET/CERES
3 CREAM (GRAM) TOPICAL AT BEDTIME
Refills: 0 | Status: DISCONTINUED | OUTPATIENT
Start: 2021-09-24 | End: 2021-09-28

## 2021-09-24 RX ORDER — ONDANSETRON 8 MG/1
4 TABLET, FILM COATED ORAL EVERY 8 HOURS
Refills: 0 | Status: DISCONTINUED | OUTPATIENT
Start: 2021-09-24 | End: 2021-09-28

## 2021-09-24 NOTE — ED PROVIDER NOTE - PROGRESS NOTE DETAILS
Al Randle is a 16 year old male here today accompanied by mother for pre op clearance.      Procedure: Bilateral inguinal versus scrotal orchiopexy  Surgeon:   Date of Surgery: 11/12/2020  Indication for Surgery: Orchidopexy is recommended to optimize testicular function, reduce malignancy risk, prevent testicular torsion, and facilitate testicular self-examination.  Prior Surgery/Hospitalizations: G tube placement in 2013 or 2014; last hospitalization for influenza 03/2020; hospitalized for diabetes inspidius in 02/2020  Prior Anesthesia?: Yes, general with G tube placement and has anesthesia for dental surgery once a year   Adverse Reaction to Anesthesia?: No  Family history of anesthesia problems?: No  Intubation history?: No  Hx of Easy Bleeding?: No  Hx of Easy Bruising?: No  Hx of Frequent Nosebleeds?: No  Family history of bleeding problems?: No  Chest pain?: No  Cough?: No  Dyspnea?: No  Cyanosis?: No  Edema?: No  Palpitations?: No  Wheezing?: No  Snoring?: Yes randomly when he sleeps, especially on long days; no hx of apneic events   Hx of asthma?: No  Hx of heart disease?: No  Nerve/Muscle Disorders?: No  Pertinent family history?: No  Current illness?: No  URI symptoms in the last 4 weeks?: No  Prophylactic Antibiotics?: No    Last seizure occurred 11/4/2020 and lasted 20 seconds. Previous seizure occurred 2 months prior. Per mother, seizure was typical of seizure activity. Left side is affected by seizures, in which left side stiffens and twitches for duration of seizure. Mother states that after which, left side typically goes numb for 10-15 minutes after. Right side is unaffected during seizure. Al is back to baseline now, per mother.       Patient's medications, allergies, past medical, surgical, social and family histories were reviewed and updated as appropriate.    Review of Systems   All other systems reviewed and are negative.      Objective    Visit Vitals  /65 (BP  Location: RUE - Right upper extremity, Patient Position: Sitting)   Pulse 72   Temp 97.1 °F (36.2 °C) (Axillary)   Ht 4' 5.54\" (1.36 m)   Wt (!) 33.8 kg (74 lb 8.3 oz)   BMI 18.27 kg/m²     Growth percentiles: <1 %ile (Z= -3.37) based on CDC (Boys, 2-20 Years) Stature-for-age data based on Stature recorded on 11/5/2020. <1 %ile (Z= -2.59) based on CDC (Boys, 2-20 Years) weight-for-age data using vitals from 11/5/2020.     Physical Exam   Constitutional: He appears well-developed.   HENT:   Head: Normocephalic and atraumatic.   Right Ear: External ear normal.   Left Ear: External ear normal.   Mouth/Throat: Oropharynx is clear and moist. No oropharyngeal exudate.   Tonsils grade 2-3+   Eyes: Pupils are equal, round, and reactive to light.   Unable to follow commands smoothly regarding motion of eyes to assess CN's; visual nystagmus noted with eye movement laterally, bilaterally, in direction of gaze   Neck: Normal range of motion. Neck supple.   Cardiovascular: Normal rate and regular rhythm.   Pulmonary/Chest: Effort normal and breath sounds normal.   Abdominal: Soft. Bowel sounds are normal.   Genitourinary:    Penis normal.      Genitourinary Comments: Undescended testes bilaterally; small scrotum     Musculoskeletal: Normal range of motion.   Neurological: He is alert.   Skin: Skin is warm and dry.   Vitals reviewed.      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)    ACC (agenesis of corpus callosum) (CMS/HCC)       Ophthalmic    Septo-optic dysplasia (CMS/HCC)       Digestive    Gastrostomy tube in place (CMS/HCC) - Primary       Urinary    Bilateral undescended testicles       Endocrine    Thyroid disorder        Other    Alteration of awareness    Global developmental delay    Weight loss    Developmental disability        Al is a 17 yo male with complicated pmhx including history of septo optic dysplasia, multiple pituitary hormone deficiencies, hx of diabetes insipidus, growth disorder and growth hormone deficiency, thyroid disease, G tube dependence for medication administration/supplemental feeding, Lennox gastaut epilepsy, agenesis of corpus callosum, developmental delay, weight loss and bilateral undescended testicles, presenting for pre-op clearance for Bilateral inguinal versus scrotal orchiopexy on 11/12/2020 with Dr. De Jesus. Patient tolerated previous anesthesia without difficulties or complications. No family history of anesthetic complications. Patient medically cleared for procedure. However, always recommend medical re-evaluation day of procedure in case anything changes during this time.     Nutrition   - Patient has been taking everything PO and only medication via G tube, which is different from what was recommended by dietician; Mother has had difficulties getting in contact with dietician about setting up home G tube feedings. Sent a staff message to Amber Curtis, who evaluated patient previously about calling mother to answer any questions.   - Encouraged mother to follow dietician recommendations considering patient's low weight      Specialist to following with:   Endocrine - Dr. Sanford   Neurologist - Dr. Steel   Dentist - Dr. 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 Sent a staff message to Prema WORLEY) to help address mothers issues, if possible.       Pre-Operative Recommendations:  Prophylactic  antibiotics: No  Surgical Risk:  Medium   Faxed pre-op clearance to the office of Dr. De Jesus  Labs: Will obtain BMP prior to surgery due to daily sodium supplementation and hx of diabetes insipidus     Patient may or may not require pre-op admission inpatient due to being NPO night prior. Will touch base with Dr. Liriano and Dr. De Jesus regarding plan.     Cannot guarantee the absence of any complications with procedure, but can only  the level of risk and whether the risk would appear to be acceptable based on history and physical examination.       Ilia Rosas MD, PGY-2  [facility]  #     spoke with Dr. Patton advised one unit platelet and solumedrol Q40   will admit

## 2021-09-24 NOTE — H&P ADULT - ASSESSMENT
24 y/o male with PMH of ITP presents to the ED after being sent in by Dr Moy (hematology) for low platelets, admit for acute thrombocytopenia.  26 y/o male with PMH of ITP presents to the ED after being sent in by Dr Moy (hematology) for low platelets, admit for ITP

## 2021-09-24 NOTE — ED ADULT NURSE NOTE - OBJECTIVE STATEMENT
patient with hx of ITP and states he believes his platelets are low. No bleeding noted, no bruising any skin abnormalities. Denies any pain, SOB.
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
## 2021-09-24 NOTE — H&P ADULT - PROBLEM SELECTOR PLAN 1
Patient presenting to the ED after being sent in by Dr Moy after he noticed some gingival bleeding, platelet count 0 on admission  - Admit to [facility]  - Patient with recent admission to [facility] from 9/14-9/18/21 for same issue, was treated with IVIG, steroids, and had a rituxan infusion, and received 2 units of platelets prior to pt leaving AMA   - Start Solumedrol 40mg IVP q6h  - Ordered for 1 unit of platelets  - Patient is currently on Promacta, will defer to heme/onc on starting this medication while inpatient   - Monitor platelets with daily CBC   - Hematology (Dr. Oakes) consulted, f/u recs. Patient presenting to the ED after being sent in by Dr Moy after he noticed some gingival bleeding, platelet count 0 on admission  - Admit to [facility]  - Patient with recent admission to [facility] from 9/14-9/18/21 for same issue, was treated with IVIG, steroids, and had a rituxan infusion, and received 2 units of platelets prior to pt leaving AMA   - Start decadron 40 x 3 days  - Ordered for 1 unit of platelets in ED  - Patient is currently on Promacta, will defer to heme/onc on starting/continuing this medication while inpatient   - Monitor platelets with daily CBC   - monitor for S&S bleeding  - neuro checks   - Hematology (Dr. Oakes) consulted, f/u recs. Patient presenting to the ED after being sent in by Dr Moy after he noticed some gingival bleeding, platelet count 0 on admission  - Admit to [facility]  - Patient with recent admission to [facility] from 9/14-9/18/21 for same issue, was treated with IVIG, steroids, and had a rituxan infusion, and received 2 units of platelets prior to pt leaving AMA   - Start decadron 40 QD  - Ordered for 1 unit of platelets in ED  - Patient is currently on Promacta, will defer to heme/onc on starting/continuing this medication while inpatient   - Monitor platelets with daily CBC   - monitor for S&S bleeding  - neuro checks   - Hematology (Dr. Oakes) consulted, f/u recs.

## 2021-09-24 NOTE — ED PROVIDER NOTE - ENMT, MLM
Airway patent, Nasal mucosa clear. Mouth with normal mucosa. Throat has no vesicles, no oropharyngeal exudates and uvula is midline. hemorrhagic lesions upper roof of mouth and tongue
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
## 2021-09-24 NOTE — ED ADULT NURSE NOTE - NSIMPLEMENTINTERV_GEN_ALL_ED
Implemented All Universal Safety Interventions:  [place] to call system. Call bell, personal items and telephone within reach. Instruct patient to call for assistance. Room bathroom lighting operational. Non-slip footwear when patient is off stretcher. Physically safe environment: no spills, clutter or unnecessary equipment. Stretcher in lowest position, wheels locked, appropriate side rails in place.

## 2021-09-24 NOTE — H&P ADULT - PROBLEM SELECTOR PLAN 2
Likely due to ITP  - On exam today patient has some dried blood around his gums and tongue lesions   - IV steroids and platelet transfusion as per heme recs  - Dr. Moy to see in AM.
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
## 2021-09-24 NOTE — H&P ADULT - NSHPPHYSICALEXAM_GEN_ALL_CORE
Vital Signs Last 24 Hrs  T(C): 36.8 (24 Sep 2021 19:54), Max: 36.8 (24 Sep 2021 19:54)  T(F): 98.3 (24 Sep 2021 19:54), Max: 98.3 (24 Sep 2021 19:54)  HR: 94 (24 Sep 2021 19:54) (94 - 94)  BP: 127/79 (24 Sep 2021 19:54) (127/79 - 127/79)  RR: 16 (24 Sep 2021 19:54) (16 - 16)  SpO2: 100% (24 Sep 2021 19:54) (100% - 100%)    GENERAL: young male in no acute distress  EYES: sclera clear, no exudates  ENMT: nonbleeding lesions on tongue with some dried blood on gums and tongue   NECK: supple, soft, no thyromegaly noted  LUNGS: good air entry bilaterally, clear to auscultation, symmetric breath sounds, no wheezing or rhonchi appreciated  HEART: soft S1/S2, regular rate and rhythm, no murmurs noted, no lower extremity edema  GASTROINTESTINAL: abdomen is soft, nontender, nondistended, normoactive bowel sounds, no palpable masses  INTEGUMENT: good skin turgor, no lesions noted  MUSCULOSKELETAL: no cyanosis, clubbing, edema, calves nontender to palpation b/l  NEUROLOGIC: awake, alert, oriented x3, good muscle tone in 4 extremities, no obvious sensory deficits  PSYCHIATRIC: mood is good, affect is congruent, linear and logical thought process  HEME/LYMPH: no palpable supraclavicular nodules, no obvious ecchymosis or petechiae

## 2021-09-25 PROBLEM — D69.3 IMMUNE THROMBOCYTOPENIC PURPURA: Chronic | Status: ACTIVE | Noted: 2021-09-14

## 2021-09-25 LAB
ALBUMIN SERPL ELPH-MCNC: 3.3 G/DL — SIGNIFICANT CHANGE UP (ref 3.3–5)
ALP SERPL-CCNC: 51 U/L — SIGNIFICANT CHANGE UP (ref 40–120)
ALT FLD-CCNC: 65 U/L — SIGNIFICANT CHANGE UP (ref 12–78)
ANION GAP SERPL CALC-SCNC: 6 MMOL/L — SIGNIFICANT CHANGE UP (ref 5–17)
AST SERPL-CCNC: 39 U/L — HIGH (ref 15–37)
BASOPHILS # BLD AUTO: 0.02 K/UL — SIGNIFICANT CHANGE UP (ref 0–0.2)
BASOPHILS # BLD AUTO: 0.02 K/UL — SIGNIFICANT CHANGE UP (ref 0–0.2)
BASOPHILS NFR BLD AUTO: 0.3 % — SIGNIFICANT CHANGE UP (ref 0–2)
BASOPHILS NFR BLD AUTO: 0.3 % — SIGNIFICANT CHANGE UP (ref 0–2)
BILIRUB SERPL-MCNC: 0.3 MG/DL — SIGNIFICANT CHANGE UP (ref 0.2–1.2)
BUN SERPL-MCNC: 17 MG/DL — SIGNIFICANT CHANGE UP (ref 7–23)
CALCIUM SERPL-MCNC: 8.9 MG/DL — SIGNIFICANT CHANGE UP (ref 8.5–10.1)
CHLORIDE SERPL-SCNC: 101 MMOL/L — SIGNIFICANT CHANGE UP (ref 96–108)
CO2 SERPL-SCNC: 28 MMOL/L — SIGNIFICANT CHANGE UP (ref 22–31)
CREAT SERPL-MCNC: 1.2 MG/DL — SIGNIFICANT CHANGE UP (ref 0.5–1.3)
EOSINOPHIL # BLD AUTO: 0.02 K/UL — SIGNIFICANT CHANGE UP (ref 0–0.5)
EOSINOPHIL # BLD AUTO: 0.15 K/UL — SIGNIFICANT CHANGE UP (ref 0–0.5)
EOSINOPHIL NFR BLD AUTO: 0.3 % — SIGNIFICANT CHANGE UP (ref 0–6)
EOSINOPHIL NFR BLD AUTO: 2 % — SIGNIFICANT CHANGE UP (ref 0–6)
FOLATE RBC-MCNC: 914 NG/ML — SIGNIFICANT CHANGE UP (ref 499–1504)
FOLATE SERPL-MCNC: 12.1 NG/ML — SIGNIFICANT CHANGE UP
GLUCOSE SERPL-MCNC: 129 MG/DL — HIGH (ref 70–99)
HCT VFR BLD CALC: 35.9 % — LOW (ref 39–50)
HCT VFR BLD CALC: 38.2 % — LOW (ref 39–50)
HCT VFR BLD CALC: 38.5 % — LOW (ref 39–50)
HGB BLD-MCNC: 12.4 G/DL — LOW (ref 13–17)
HGB BLD-MCNC: 13 G/DL — SIGNIFICANT CHANGE UP (ref 13–17)
IGA FLD-MCNC: 138 MG/DL — SIGNIFICANT CHANGE UP (ref 84–499)
IGG FLD-MCNC: 2266 MG/DL — HIGH (ref 610–1660)
IGM SERPL-MCNC: 82 MG/DL — SIGNIFICANT CHANGE UP (ref 35–242)
IMM GRANULOCYTES NFR BLD AUTO: 2.9 % — HIGH (ref 0–1.5)
IMM GRANULOCYTES NFR BLD AUTO: 3.6 % — HIGH (ref 0–1.5)
KAPPA LC SER QL IFE: 1.17 MG/DL — SIGNIFICANT CHANGE UP (ref 0.33–1.94)
KAPPA/LAMBDA FREE LIGHT CHAIN RATIO, SERUM: 1.98 RATIO — HIGH (ref 0.26–1.65)
LAMBDA LC SER QL IFE: 0.59 MG/DL — SIGNIFICANT CHANGE UP (ref 0.57–2.63)
LYMPHOCYTES # BLD AUTO: 0.77 K/UL — LOW (ref 1–3.3)
LYMPHOCYTES # BLD AUTO: 0.79 K/UL — LOW (ref 1–3.3)
LYMPHOCYTES # BLD AUTO: 10.7 % — LOW (ref 13–44)
LYMPHOCYTES # BLD AUTO: 9.7 % — LOW (ref 13–44)
MCHC RBC-ENTMCNC: 30 PG — SIGNIFICANT CHANGE UP (ref 27–34)
MCHC RBC-ENTMCNC: 30 PG — SIGNIFICANT CHANGE UP (ref 27–34)
MCHC RBC-ENTMCNC: 34 GM/DL — SIGNIFICANT CHANGE UP (ref 32–36)
MCHC RBC-ENTMCNC: 34.5 GM/DL — SIGNIFICANT CHANGE UP (ref 32–36)
MCV RBC AUTO: 86.9 FL — SIGNIFICANT CHANGE UP (ref 80–100)
MCV RBC AUTO: 88 FL — SIGNIFICANT CHANGE UP (ref 80–100)
MONOCYTES # BLD AUTO: 0.32 K/UL — SIGNIFICANT CHANGE UP (ref 0–0.9)
MONOCYTES # BLD AUTO: 0.48 K/UL — SIGNIFICANT CHANGE UP (ref 0–0.9)
MONOCYTES NFR BLD AUTO: 4.3 % — SIGNIFICANT CHANGE UP (ref 2–14)
MONOCYTES NFR BLD AUTO: 6 % — SIGNIFICANT CHANGE UP (ref 2–14)
NEUTROPHILS # BLD AUTO: 5.86 K/UL — SIGNIFICANT CHANGE UP (ref 1.8–7.4)
NEUTROPHILS # BLD AUTO: 6.43 K/UL — SIGNIFICANT CHANGE UP (ref 1.8–7.4)
NEUTROPHILS NFR BLD AUTO: 79.1 % — HIGH (ref 43–77)
NEUTROPHILS NFR BLD AUTO: 80.8 % — HIGH (ref 43–77)
NRBC # BLD: 0 /100 WBCS — SIGNIFICANT CHANGE UP (ref 0–0)
NRBC # BLD: 0 /100 WBCS — SIGNIFICANT CHANGE UP (ref 0–0)
PLATELET # BLD AUTO: 1 K/UL — CRITICAL LOW (ref 150–400)
PLATELET # BLD AUTO: 2 K/UL — CRITICAL LOW (ref 150–400)
POTASSIUM SERPL-MCNC: 4.5 MMOL/L — SIGNIFICANT CHANGE UP (ref 3.5–5.3)
POTASSIUM SERPL-SCNC: 4.5 MMOL/L — SIGNIFICANT CHANGE UP (ref 3.5–5.3)
PROT SERPL-MCNC: 8.7 G/DL — HIGH (ref 6–8.3)
RBC # BLD: 4.13 M/UL — LOW (ref 4.2–5.8)
RBC # BLD: 4.34 M/UL — SIGNIFICANT CHANGE UP (ref 4.2–5.8)
RBC # FLD: 12.9 % — SIGNIFICANT CHANGE UP (ref 10.3–14.5)
RBC # FLD: 13 % — SIGNIFICANT CHANGE UP (ref 10.3–14.5)
SODIUM SERPL-SCNC: 135 MMOL/L — SIGNIFICANT CHANGE UP (ref 135–145)
VIT B12 SERPL-MCNC: 457 PG/ML — SIGNIFICANT CHANGE UP (ref 232–1245)
WBC # BLD: 7.41 K/UL — SIGNIFICANT CHANGE UP (ref 3.8–10.5)
WBC # BLD: 7.95 K/UL — SIGNIFICANT CHANGE UP (ref 3.8–10.5)
WBC # FLD AUTO: 7.41 K/UL — SIGNIFICANT CHANGE UP (ref 3.8–10.5)
WBC # FLD AUTO: 7.95 K/UL — SIGNIFICANT CHANGE UP (ref 3.8–10.5)

## 2021-09-25 PROCEDURE — 93010 ELECTROCARDIOGRAM REPORT: CPT

## 2021-09-25 RX ORDER — DEXAMETHASONE 0.5 MG/5ML
40 ELIXIR ORAL DAILY
Refills: 0 | Status: DISCONTINUED | OUTPATIENT
Start: 2021-09-25 | End: 2021-09-25

## 2021-09-25 RX ORDER — PREGABALIN 225 MG/1
1000 CAPSULE ORAL DAILY
Refills: 0 | Status: DISCONTINUED | OUTPATIENT
Start: 2021-09-25 | End: 2021-09-28

## 2021-09-25 RX ORDER — IMMUNE GLOBULIN (HUMAN) 10 G/100ML
80 INJECTION INTRAVENOUS; SUBCUTANEOUS EVERY 24 HOURS
Refills: 0 | Status: COMPLETED | OUTPATIENT
Start: 2021-09-25 | End: 2021-09-26

## 2021-09-25 RX ORDER — ELTROMBOPAG OLAMINE 50 MG/1
50 TABLET, FILM COATED ORAL DAILY
Refills: 0 | Status: DISCONTINUED | OUTPATIENT
Start: 2021-09-25 | End: 2021-09-28

## 2021-09-25 RX ORDER — ACETAMINOPHEN 500 MG
325 TABLET ORAL ONCE
Refills: 0 | Status: COMPLETED | OUTPATIENT
Start: 2021-09-25 | End: 2021-09-25

## 2021-09-25 RX ORDER — PREGABALIN 225 MG/1
1000 CAPSULE ORAL EVERY 24 HOURS
Refills: 0 | Status: DISCONTINUED | OUTPATIENT
Start: 2021-09-25 | End: 2021-09-28

## 2021-09-25 RX ORDER — DIPHENHYDRAMINE HCL 50 MG
50 CAPSULE ORAL DAILY
Refills: 0 | Status: COMPLETED | OUTPATIENT
Start: 2021-09-25 | End: 2021-09-26

## 2021-09-25 RX ADMIN — Medication 50 MILLIGRAM(S): at 11:01

## 2021-09-25 RX ADMIN — IMMUNE GLOBULIN (HUMAN) 40 GRAM(S): 10 INJECTION INTRAVENOUS; SUBCUTANEOUS at 13:21

## 2021-09-25 RX ADMIN — Medication 325 MILLIGRAM(S): at 11:02

## 2021-09-25 RX ADMIN — PREGABALIN 1000 MICROGRAM(S): 225 CAPSULE ORAL at 21:34

## 2021-09-25 RX ADMIN — Medication 120 MILLIGRAM(S): at 01:52

## 2021-09-25 RX ADMIN — ELTROMBOPAG OLAMINE 50 MILLIGRAM(S): 50 TABLET, FILM COATED ORAL at 13:34
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
## 2021-09-25 NOTE — ED ADULT NURSE REASSESSMENT NOTE - NS ED NURSE REASSESS COMMENT FT1
patient receiving platelets post 15 min check done with no reaction noted. No bleeding, no bruising or any complaints, no s/s blood product reaction. Dr Gabriel at bedside evaluating patient.

## 2021-09-25 NOTE — CONSULT NOTE ADULT - ASSESSMENT
[ASSESSMENT and  PLAN]  ITP, resistant/ refractory to 2 treatment of high dose Decadron 40mg x4d, and IVIG 80g.   Has temporary response only.   And then typically precipitous decline.   s/p Rituxan.     24y/o AA M, with resistant ITP, platelets 0,    Initially presented /admitted 09/03/21, with sx of gross hematuria/tongue hemorrhagic lesions/skin petechiae,   Treated then with 4 days Decadron 40mg and 2 days IVIG 80grams, w incr of plts to 49k on discharge 09/08/21    Seen in office Tue 09/14, but readmitted w plts 0 again and w hemorrhagic mucosal lesions and petechiae  Started on Solumedrol 40mg l0pezqi and post one unit plts  Then given will change steroid to IV Decadron 40mg x4 days, IVIG start today 1gm/kg=80gms x  2 days, in hope of again temp raise plts, in mean time will plan for ERx     Recently started Promacta for more definitive second line treatment    Likely also B12 deficiency. B12 levels 457, borderline.   Folate adequate.     +COVID Paulino IgG [09/15/21] but post IVIG tx.   neg COVID Paulino IgG [09/04/21] before IVIG treatment.   Has not had vaccine.       RECOMMENDATIONS  s/p 1U SDP overnight.   Transfuse 1U SDP as planned today. Check CBC within 1hr post    Continue promacta 50mg daily.     Transfuse additional SDP if pt symptomatic.     Repeat WYATT, lupus studies  Check COVID nc Ab    DVT Prophylaxis  OOB as tolerated.   High risk bleeding. Pharmacologic tx contraindicated.     Plan for repeat dose Rituxan on Mon or Tues.   Will contact pharmacy to plan    Thank you for consulting us.     I have discussed the above plan of care with patient in detail. They expressed understanding of the treatment plan . Risks, benefits and alternatives discussed in detail. I have asked if they have any questions or concerns and appropriately addressed them; all questions answered to their satisfactions and in lay terms.     d/w medical team.

## 2021-09-25 NOTE — PROVIDER CONTACT NOTE (CRITICAL VALUE NOTIFICATION) - ACTION/TREATMENT ORDERED:
IVIG in progress as ordered, pt andre well thus far
As per Dr. arriaga pt to have  1 unit of platelet transfusion, immediately followed by IVIG awaiting ordes for transfusion

## 2021-09-25 NOTE — PROVIDER CONTACT NOTE (CRITICAL VALUE NOTIFICATION) - ASSESSMENT
Pt is alert nd oriented x 4, skin warm and dry.  No signs or symptoms of bleeding.  Denies any c/o headache or visual changes,  Abd soft, non tender to palpation.  Had BM this am, denies any straining or bleeding.  No petechie observed on skin.  Voiding clear yellow urine
Pt is alert and oriented x 4.  Denies any c/o headache or visual changes.  No petechie noted.  voiding clear yellow urine.  No signs or symptoms of bleeding

## 2021-09-25 NOTE — PROGRESS NOTE ADULT - ATTENDING COMMENTS
Idiopathic thrombocytopenic purpura (ITP).   treated with IVIG, steroids, and had a Rituxan infusion, and received 2 units of platelets prior to pt leaving AMA   - S/P 1 unit of platelets in ED, Platelet count is 1 this am   - Patient is currently on Promacta 50 mg po qd,  will continuing this medication while inpatient   - Started decadron 40 QD  - Hematology (Dr. Moy) following. Will give 1 more unit of platelets this am, then IVIG and repeat cbc at 2pm today.  - Patient is given benadryl 50mg IV push and Tylenol [id number] before transfusion.  - Will Monitor platelets with daily CBC   - monitor for S&S bleeding  - neuro checks

## 2021-09-25 NOTE — PROVIDER CONTACT NOTE (CRITICAL VALUE NOTIFICATION) - RECOMMENDATIONS
Dr. Patton notified and aware.  Orders received for IVIG, as per Dr. Patton he wants 1 unit of platelet transfused first
Dr. arriaga notified

## 2021-09-26 LAB
ANA PAT FLD IF-IMP: ABNORMAL
ANA TITR SER: ABNORMAL
ANION GAP SERPL CALC-SCNC: 2 MMOL/L — LOW (ref 5–17)
BASOPHILS # BLD AUTO: 0.01 K/UL — SIGNIFICANT CHANGE UP (ref 0–0.2)
BASOPHILS NFR BLD AUTO: 0.1 % — SIGNIFICANT CHANGE UP (ref 0–2)
BUN SERPL-MCNC: 18 MG/DL — SIGNIFICANT CHANGE UP (ref 7–23)
CALCIUM SERPL-MCNC: 9.2 MG/DL — SIGNIFICANT CHANGE UP (ref 8.5–10.1)
CHLORIDE SERPL-SCNC: 104 MMOL/L — SIGNIFICANT CHANGE UP (ref 96–108)
CO2 SERPL-SCNC: 30 MMOL/L — SIGNIFICANT CHANGE UP (ref 22–31)
COVID-19 SPIKE DOMAIN AB INTERP: POSITIVE
COVID-19 SPIKE DOMAIN ANTIBODY RESULT: 134 U/ML — HIGH
CREAT SERPL-MCNC: 0.97 MG/DL — SIGNIFICANT CHANGE UP (ref 0.5–1.3)
EOSINOPHIL # BLD AUTO: 0.02 K/UL — SIGNIFICANT CHANGE UP (ref 0–0.5)
EOSINOPHIL NFR BLD AUTO: 0.2 % — SIGNIFICANT CHANGE UP (ref 0–6)
GLUCOSE SERPL-MCNC: 104 MG/DL — HIGH (ref 70–99)
HCT VFR BLD CALC: 36.5 % — LOW (ref 39–50)
HGB BLD-MCNC: 12.1 G/DL — LOW (ref 13–17)
IMM GRANULOCYTES NFR BLD AUTO: 1.6 % — HIGH (ref 0–1.5)
LYMPHOCYTES # BLD AUTO: 0.84 K/UL — LOW (ref 1–3.3)
LYMPHOCYTES # BLD AUTO: 10 % — LOW (ref 13–44)
MCHC RBC-ENTMCNC: 29.4 PG — SIGNIFICANT CHANGE UP (ref 27–34)
MCHC RBC-ENTMCNC: 33.2 GM/DL — SIGNIFICANT CHANGE UP (ref 32–36)
MCV RBC AUTO: 88.6 FL — SIGNIFICANT CHANGE UP (ref 80–100)
MONOCYTES # BLD AUTO: 0.34 K/UL — SIGNIFICANT CHANGE UP (ref 0–0.9)
MONOCYTES NFR BLD AUTO: 4.1 % — SIGNIFICANT CHANGE UP (ref 2–14)
NEUTROPHILS # BLD AUTO: 7.04 K/UL — SIGNIFICANT CHANGE UP (ref 1.8–7.4)
NEUTROPHILS NFR BLD AUTO: 84 % — HIGH (ref 43–77)
NRBC # BLD: 0 /100 WBCS — SIGNIFICANT CHANGE UP (ref 0–0)
PLATELET # BLD AUTO: 5 K/UL — CRITICAL LOW (ref 150–400)
POTASSIUM SERPL-MCNC: 4.4 MMOL/L — SIGNIFICANT CHANGE UP (ref 3.5–5.3)
POTASSIUM SERPL-SCNC: 4.4 MMOL/L — SIGNIFICANT CHANGE UP (ref 3.5–5.3)
RBC # BLD: 4.12 M/UL — LOW (ref 4.2–5.8)
RBC # FLD: 13.2 % — SIGNIFICANT CHANGE UP (ref 10.3–14.5)
SARS-COV-2 IGG+IGM SERPL QL IA: 134 U/ML — HIGH
SARS-COV-2 IGG+IGM SERPL QL IA: POSITIVE
SODIUM SERPL-SCNC: 136 MMOL/L — SIGNIFICANT CHANGE UP (ref 135–145)
WBC # BLD: 8.38 K/UL — SIGNIFICANT CHANGE UP (ref 3.8–10.5)
WBC # FLD AUTO: 8.38 K/UL — SIGNIFICANT CHANGE UP (ref 3.8–10.5)

## 2021-09-26 RX ORDER — ACETAMINOPHEN 500 MG
325 TABLET ORAL ONCE
Refills: 0 | Status: COMPLETED | OUTPATIENT
Start: 2021-09-26 | End: 2021-09-26

## 2021-09-26 RX ORDER — ACETAMINOPHEN 500 MG
650 TABLET ORAL ONCE
Refills: 0 | Status: DISCONTINUED | OUTPATIENT
Start: 2021-09-26 | End: 2021-09-26

## 2021-09-26 RX ADMIN — Medication 325 MILLIGRAM(S): at 14:06

## 2021-09-26 RX ADMIN — IMMUNE GLOBULIN (HUMAN) 40 GRAM(S): 10 INJECTION INTRAVENOUS; SUBCUTANEOUS at 14:38

## 2021-09-26 RX ADMIN — PREGABALIN 1000 MICROGRAM(S): 225 CAPSULE ORAL at 11:31

## 2021-09-26 RX ADMIN — Medication 120 MILLIGRAM(S): at 06:03

## 2021-09-26 RX ADMIN — PREGABALIN 1000 MICROGRAM(S): 225 CAPSULE ORAL at 21:55

## 2021-09-26 RX ADMIN — Medication 50 MILLIGRAM(S): at 14:07

## 2021-09-26 RX ADMIN — ELTROMBOPAG OLAMINE 50 MILLIGRAM(S): 50 TABLET, FILM COATED ORAL at 11:31

## 2021-09-27 LAB
ALBUMIN SERPL ELPH-MCNC: 2.7 G/DL — LOW (ref 3.3–5)
ALP SERPL-CCNC: 37 U/L — LOW (ref 40–120)
ALT FLD-CCNC: 52 U/L — SIGNIFICANT CHANGE UP (ref 12–78)
ANION GAP SERPL CALC-SCNC: 3 MMOL/L — LOW (ref 5–17)
AST SERPL-CCNC: 18 U/L — SIGNIFICANT CHANGE UP (ref 15–37)
BASOPHILS # BLD AUTO: 0.02 K/UL — SIGNIFICANT CHANGE UP (ref 0–0.2)
BASOPHILS NFR BLD AUTO: 0.1 % — SIGNIFICANT CHANGE UP (ref 0–2)
BILIRUB SERPL-MCNC: 0.2 MG/DL — SIGNIFICANT CHANGE UP (ref 0.2–1.2)
BUN SERPL-MCNC: 23 MG/DL — SIGNIFICANT CHANGE UP (ref 7–23)
CALCIUM SERPL-MCNC: 8.4 MG/DL — LOW (ref 8.5–10.1)
CHLORIDE SERPL-SCNC: 105 MMOL/L — SIGNIFICANT CHANGE UP (ref 96–108)
CO2 SERPL-SCNC: 28 MMOL/L — SIGNIFICANT CHANGE UP (ref 22–31)
CREAT SERPL-MCNC: 1 MG/DL — SIGNIFICANT CHANGE UP (ref 0.5–1.3)
DRVVT SCREEN TO CONFIRM RATIO: SIGNIFICANT CHANGE UP
EOSINOPHIL # BLD AUTO: 0.01 K/UL — SIGNIFICANT CHANGE UP (ref 0–0.5)
EOSINOPHIL NFR BLD AUTO: 0.1 % — SIGNIFICANT CHANGE UP (ref 0–6)
GLUCOSE SERPL-MCNC: 112 MG/DL — HIGH (ref 70–99)
HCT VFR BLD CALC: 34.3 % — LOW (ref 39–50)
HGB BLD-MCNC: 11.8 G/DL — LOW (ref 13–17)
IMM GRANULOCYTES NFR BLD AUTO: 1 % — SIGNIFICANT CHANGE UP (ref 0–1.5)
LA NT DPL PPP QL: 44.4 SEC — SIGNIFICANT CHANGE UP
LYMPHOCYTES # BLD AUTO: 0.92 K/UL — LOW (ref 1–3.3)
LYMPHOCYTES # BLD AUTO: 6.8 % — LOW (ref 13–44)
MCHC RBC-ENTMCNC: 30.2 PG — SIGNIFICANT CHANGE UP (ref 27–34)
MCHC RBC-ENTMCNC: 34.4 GM/DL — SIGNIFICANT CHANGE UP (ref 32–36)
MCV RBC AUTO: 87.7 FL — SIGNIFICANT CHANGE UP (ref 80–100)
MONOCYTES # BLD AUTO: 0.29 K/UL — SIGNIFICANT CHANGE UP (ref 0–0.9)
MONOCYTES NFR BLD AUTO: 2.1 % — SIGNIFICANT CHANGE UP (ref 2–14)
NEUTROPHILS # BLD AUTO: 12.19 K/UL — HIGH (ref 1.8–7.4)
NEUTROPHILS NFR BLD AUTO: 89.9 % — HIGH (ref 43–77)
NORMALIZED SCT PPP-RTO: 1.04 RATIO — SIGNIFICANT CHANGE UP (ref 0–1.16)
NORMALIZED SCT PPP-RTO: SIGNIFICANT CHANGE UP
NRBC # BLD: 0 /100 WBCS — SIGNIFICANT CHANGE UP (ref 0–0)
PLATELET # BLD AUTO: 40 K/UL — LOW (ref 150–400)
POTASSIUM SERPL-MCNC: 4.3 MMOL/L — SIGNIFICANT CHANGE UP (ref 3.5–5.3)
POTASSIUM SERPL-SCNC: 4.3 MMOL/L — SIGNIFICANT CHANGE UP (ref 3.5–5.3)
PROT SERPL-MCNC: 10.2 G/DL — HIGH (ref 6–8.3)
RBC # BLD: 3.91 M/UL — LOW (ref 4.2–5.8)
RBC # FLD: 13.2 % — SIGNIFICANT CHANGE UP (ref 10.3–14.5)
SODIUM SERPL-SCNC: 136 MMOL/L — SIGNIFICANT CHANGE UP (ref 135–145)
WBC # BLD: 13.57 K/UL — HIGH (ref 3.8–10.5)
WBC # FLD AUTO: 13.57 K/UL — HIGH (ref 3.8–10.5)

## 2021-09-27 RX ORDER — DIPHENHYDRAMINE HYDROCHLORIDE AND LIDOCAINE HYDROCHLORIDE AND ALUMINUM HYDROXIDE AND MAGNESIUM HYDRO
5 KIT EVERY 6 HOURS
Refills: 0 | Status: DISCONTINUED | OUTPATIENT
Start: 2021-09-27 | End: 2021-09-28

## 2021-09-27 RX ADMIN — DIPHENHYDRAMINE HYDROCHLORIDE AND LIDOCAINE HYDROCHLORIDE AND ALUMINUM HYDROXIDE AND MAGNESIUM HYDRO 5 MILLILITER(S): KIT at 17:32

## 2021-09-27 RX ADMIN — ELTROMBOPAG OLAMINE 50 MILLIGRAM(S): 50 TABLET, FILM COATED ORAL at 12:15

## 2021-09-27 RX ADMIN — PREGABALIN 1000 MICROGRAM(S): 225 CAPSULE ORAL at 17:34

## 2021-09-27 RX ADMIN — Medication 120 MILLIGRAM(S): at 05:24

## 2021-09-27 RX ADMIN — PREGABALIN 1000 MICROGRAM(S): 225 CAPSULE ORAL at 12:15

## 2021-09-27 NOTE — DISCHARGE NOTE PROVIDER - NSDCMRMEDTOKEN_GEN_ALL_CORE_FT
Promacta 12.5 mg oral tablet: 1 tab(s) orally once a day   diphenhydramine/lidocaine/aluminum hydroxide/magnesium hydroxide/simethicone mucous membrane suspension: 5 milliliter(s) swish and spit every 8 hours as needed  Promacta 12.5 mg oral tablet: 1 tab(s) orally once a day

## 2021-09-27 NOTE — DISCHARGE NOTE PROVIDER - CARE PROVIDER_API CALL
Guy Amos (DO)  Family Medicine  [address], [address]  [place] [address]  Phone: [phone]  Fax: [phone]  Follow Up Time: 1 week    Zahraa Moy)  Medical Oncology  [address], [address]  [place] [address]  Phone: [phone]  Fax: [phone]  Scheduled Appointment: 10/01/2021
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
## 2021-09-27 NOTE — DISCHARGE NOTE PROVIDER - NSDCFUADDINST_GEN_ALL_CORE_FT
Follow up with your hematologist on your scheduled appointment of 10/1/21.  Follow up with your primary care doctor within 1 week of discharge. If you do not have a primary care doctor the information of, Dr. Amos, has been provided.

## 2021-09-27 NOTE — PROGRESS NOTE ADULT - ATTENDING COMMENTS
24 y/o male with PMH of ITP presents to the ED after being sent in by Dr Moy (hematology) for low platelets, admitted for ITP.     Idiopathic thrombocytopenic purpura (ITP).   -- S/P 1 unit of platelets in ED, plt count 0 --> 1 --> 2 --> 5 --> 40 today  - Patient is given benadryl 50mg IV push and Tylenol [id number] before transfusion.  - Patient is currently on Promacta 50 mg po qd,  will continuing this medication while inpatient   - pt is s/p 2/2 days of IVIG (9/25-9/26)  - today 9/27 is day 4/4 of decadron 40mg IV, course completed  - Planned for Rituxan 375 mg/m2 on 9/28/21 as per Dr. Moy  - No indication for platelet transfusion today

## 2021-09-27 NOTE — DISCHARGE NOTE PROVIDER - PROVIDER TOKENS
PROVIDER:[TOKEN:[[id number]],FOLLOWUP:[1 week]],PROVIDER:[TOKEN:[[id number]],SCHEDULEDAPPT:[10/01/2021]]

## 2021-09-27 NOTE — SBIRT NOTE ADULT - NSSBIRTALCPOSREINDET_GEN_A_CORE
Drinks 1 to 2 drinks socially on occasion, no issues. Reinforcement provided re. positive drinking habits.

## 2021-09-27 NOTE — DISCHARGE NOTE PROVIDER - NSDCCPCAREPLAN_GEN_ALL_CORE_FT
PRINCIPAL DISCHARGE DIAGNOSIS  Diagnosis: Idiopathic thrombocytopenic purpura (ITP)  Assessment and Plan of Treatment: - You were treated with IVIG, steroids, and a Rituxan infusion while in the hospital.  - You recieved 1uint of platelets as well.  - Your platelet number improved while you were in the hosital.  - Continue your home Promacta.  - You were prescibed Magic Mouthwash and may use it 3 times a day as needed. You are not to swallow this medication.  - You were followed by hematology specialists  - Follow up with your hematologist, Dr. Moy, on Friday.  - Follow up with your primary care doctor within 1 week of discharge.

## 2021-09-27 NOTE — SBIRT NOTE ADULT - NSSBIRTDRGBRIEFINTDET_GEN_A_CORE
Pt. admits to marijuana use, no other drug use. Did say he feels guilty re. use for financial reasons. Denied any other issues or feelings of dependence. Encouraged to be cognizant of his amount of use. Educated re. availability of NARCAN kit and he does want kit. Pharmacy notified and will be contacted when he is ready for d/c.  Pt. admits to marijuana use, no other drug use. Did say he feels guilty re. use for financial reasons. Denied any other issues or feelings of dependence. Encouraged to be cognizant of his amount of use. Educated re. availability of NARCAN kit and he does want kit. Pharmacy notified and needs to be contacted when he is ready for d/c so they can dispense & train him on NARCAN kit.

## 2021-09-28 VITALS
HEART RATE: 71 BPM | RESPIRATION RATE: 15 BRPM | SYSTOLIC BLOOD PRESSURE: 116 MMHG | DIASTOLIC BLOOD PRESSURE: 71 MMHG | TEMPERATURE: 98 F | OXYGEN SATURATION: 98 %

## 2021-09-28 LAB
ALBUMIN SERPL ELPH-MCNC: 2.3 G/DL — LOW (ref 3.3–5)
ALP SERPL-CCNC: 39 U/L — LOW (ref 40–120)
ALT FLD-CCNC: 38 U/L — SIGNIFICANT CHANGE UP (ref 12–78)
ANION GAP SERPL CALC-SCNC: 4 MMOL/L — LOW (ref 5–17)
AST SERPL-CCNC: 15 U/L — SIGNIFICANT CHANGE UP (ref 15–37)
BASOPHILS # BLD AUTO: 0.01 K/UL — SIGNIFICANT CHANGE UP (ref 0–0.2)
BASOPHILS NFR BLD AUTO: 0.1 % — SIGNIFICANT CHANGE UP (ref 0–2)
BILIRUB SERPL-MCNC: 0.2 MG/DL — SIGNIFICANT CHANGE UP (ref 0.2–1.2)
BUN SERPL-MCNC: 21 MG/DL — SIGNIFICANT CHANGE UP (ref 7–23)
CALCIUM SERPL-MCNC: 8.1 MG/DL — LOW (ref 8.5–10.1)
CHLORIDE SERPL-SCNC: 106 MMOL/L — SIGNIFICANT CHANGE UP (ref 96–108)
CO2 SERPL-SCNC: 28 MMOL/L — SIGNIFICANT CHANGE UP (ref 22–31)
CREAT SERPL-MCNC: 0.92 MG/DL — SIGNIFICANT CHANGE UP (ref 0.5–1.3)
EOSINOPHIL # BLD AUTO: 0.01 K/UL — SIGNIFICANT CHANGE UP (ref 0–0.5)
EOSINOPHIL NFR BLD AUTO: 0.1 % — SIGNIFICANT CHANGE UP (ref 0–6)
GLUCOSE SERPL-MCNC: 101 MG/DL — HIGH (ref 70–99)
HCT VFR BLD CALC: 32.6 % — LOW (ref 39–50)
HGB BLD-MCNC: 10.9 G/DL — LOW (ref 13–17)
IMM GRANULOCYTES NFR BLD AUTO: 1.2 % — SIGNIFICANT CHANGE UP (ref 0–1.5)
LYMPHOCYTES # BLD AUTO: 1.55 K/UL — SIGNIFICANT CHANGE UP (ref 1–3.3)
LYMPHOCYTES # BLD AUTO: 14.1 % — SIGNIFICANT CHANGE UP (ref 13–44)
MCHC RBC-ENTMCNC: 29.7 PG — SIGNIFICANT CHANGE UP (ref 27–34)
MCHC RBC-ENTMCNC: 33.4 GM/DL — SIGNIFICANT CHANGE UP (ref 32–36)
MCV RBC AUTO: 88.8 FL — SIGNIFICANT CHANGE UP (ref 80–100)
MONOCYTES # BLD AUTO: 0.92 K/UL — HIGH (ref 0–0.9)
MONOCYTES NFR BLD AUTO: 8.3 % — SIGNIFICANT CHANGE UP (ref 2–14)
NEUTROPHILS # BLD AUTO: 8.4 K/UL — HIGH (ref 1.8–7.4)
NEUTROPHILS NFR BLD AUTO: 76.2 % — SIGNIFICANT CHANGE UP (ref 43–77)
NRBC # BLD: 0 /100 WBCS — SIGNIFICANT CHANGE UP (ref 0–0)
PLATELET # BLD AUTO: 49 K/UL — LOW (ref 150–400)
POTASSIUM SERPL-MCNC: 3.6 MMOL/L — SIGNIFICANT CHANGE UP (ref 3.5–5.3)
POTASSIUM SERPL-SCNC: 3.6 MMOL/L — SIGNIFICANT CHANGE UP (ref 3.5–5.3)
PROT SERPL-MCNC: 8.4 G/DL — HIGH (ref 6–8.3)
RBC # BLD: 3.67 M/UL — LOW (ref 4.2–5.8)
RBC # FLD: 13.2 % — SIGNIFICANT CHANGE UP (ref 10.3–14.5)
SODIUM SERPL-SCNC: 138 MMOL/L — SIGNIFICANT CHANGE UP (ref 135–145)
WBC # BLD: 11.02 K/UL — HIGH (ref 3.8–10.5)
WBC # FLD AUTO: 11.02 K/UL — HIGH (ref 3.8–10.5)

## 2021-09-28 PROCEDURE — 86038 ANTINUCLEAR ANTIBODIES: CPT

## 2021-09-28 PROCEDURE — 80053 COMPREHEN METABOLIC PANEL: CPT

## 2021-09-28 PROCEDURE — 36415 COLL VENOUS BLD VENIPUNCTURE: CPT

## 2021-09-28 PROCEDURE — 99239 HOSP IP/OBS DSCHRG MGMT >30: CPT | Mod: GC

## 2021-09-28 PROCEDURE — 82746 ASSAY OF FOLIC ACID SERUM: CPT

## 2021-09-28 PROCEDURE — 86850 RBC ANTIBODY SCREEN: CPT

## 2021-09-28 PROCEDURE — 82607 VITAMIN B-12: CPT

## 2021-09-28 PROCEDURE — 70450 CT HEAD/BRAIN W/O DYE: CPT | Mod: MA

## 2021-09-28 PROCEDURE — 87635 SARS-COV-2 COVID-19 AMP PRB: CPT

## 2021-09-28 PROCEDURE — 80048 BASIC METABOLIC PNL TOTAL CA: CPT

## 2021-09-28 PROCEDURE — 86900 BLOOD TYPING SEROLOGIC ABO: CPT

## 2021-09-28 PROCEDURE — 85730 THROMBOPLASTIN TIME PARTIAL: CPT

## 2021-09-28 PROCEDURE — P9100: CPT

## 2021-09-28 PROCEDURE — 82747 ASSAY OF FOLIC ACID RBC: CPT

## 2021-09-28 PROCEDURE — 85025 COMPLETE CBC W/AUTO DIFF WBC: CPT

## 2021-09-28 PROCEDURE — 82784 ASSAY IGA/IGD/IGG/IGM EACH: CPT

## 2021-09-28 PROCEDURE — 86901 BLOOD TYPING SEROLOGIC RH(D): CPT

## 2021-09-28 PROCEDURE — 99285 EMERGENCY DEPT VISIT HI MDM: CPT

## 2021-09-28 PROCEDURE — 36430 TRANSFUSION BLD/BLD COMPNT: CPT

## 2021-09-28 PROCEDURE — 93005 ELECTROCARDIOGRAM TRACING: CPT

## 2021-09-28 PROCEDURE — P9037: CPT

## 2021-09-28 PROCEDURE — 86769 SARS-COV-2 COVID-19 ANTIBODY: CPT

## 2021-09-28 PROCEDURE — 85610 PROTHROMBIN TIME: CPT

## 2021-09-28 RX ORDER — ACETAMINOPHEN 500 MG
650 TABLET ORAL ONCE
Refills: 0 | Status: COMPLETED | OUTPATIENT
Start: 2021-09-28 | End: 2021-09-28

## 2021-09-28 RX ORDER — DIPHENHYDRAMINE HCL 50 MG
25 CAPSULE ORAL ONCE
Refills: 0 | Status: COMPLETED | OUTPATIENT
Start: 2021-09-28 | End: 2021-09-28

## 2021-09-28 RX ORDER — DIPHENHYDRAMINE HYDROCHLORIDE AND LIDOCAINE HYDROCHLORIDE AND ALUMINUM HYDROXIDE AND MAGNESIUM HYDRO
5 KIT
Qty: 450 | Refills: 0
Start: 2021-09-28 | End: 2021-10-27

## 2021-09-28 RX ORDER — RITUXIMAB 10 MG/ML
50 INJECTION, SOLUTION INTRAVENOUS
Qty: 825 | Refills: 0 | Status: DISCONTINUED | OUTPATIENT
Start: 2021-09-28 | End: 2021-09-28

## 2021-09-28 RX ADMIN — RITUXIMAB 50 MG/HR: 10 INJECTION, SOLUTION INTRAVENOUS at 11:54

## 2021-09-28 RX ADMIN — Medication 25 MILLIGRAM(S): at 10:54

## 2021-09-28 RX ADMIN — DIPHENHYDRAMINE HYDROCHLORIDE AND LIDOCAINE HYDROCHLORIDE AND ALUMINUM HYDROXIDE AND MAGNESIUM HYDRO 5 MILLILITER(S): KIT at 00:01

## 2021-09-28 RX ADMIN — Medication 650 MILLIGRAM(S): at 10:54

## 2021-09-28 RX ADMIN — DIPHENHYDRAMINE HYDROCHLORIDE AND LIDOCAINE HYDROCHLORIDE AND ALUMINUM HYDROXIDE AND MAGNESIUM HYDRO 5 MILLILITER(S): KIT at 11:39

## 2021-09-28 RX ADMIN — DIPHENHYDRAMINE HYDROCHLORIDE AND LIDOCAINE HYDROCHLORIDE AND ALUMINUM HYDROXIDE AND MAGNESIUM HYDRO 5 MILLILITER(S): KIT at 05:21

## 2021-09-28 RX ADMIN — ELTROMBOPAG OLAMINE 50 MILLIGRAM(S): 50 TABLET, FILM COATED ORAL at 11:40

## 2021-09-28 RX ADMIN — PREGABALIN 1000 MICROGRAM(S): 225 CAPSULE ORAL at 11:39

## 2021-09-28 NOTE — DISCHARGE NOTE NURSING/CASE MANAGEMENT/SOCIAL WORK - PATIENT PORTAL LINK FT
You can access the FollowMyHealth Patient Portal offered by [facility] by registering at the following website: [email or web address]. By joining [facility]’s FollowMyHealth portal, you will also be able to view your health information using other applications (apps) compatible with our system.

## 2021-09-28 NOTE — PROGRESS NOTE ADULT - REASON FOR ADMISSION
thrombocytopenia
ITP with thrombocytopenia

## 2021-09-28 NOTE — PROGRESS NOTE ADULT - ASSESSMENT
26 y/o male with PMH of ITP presents to the ED after being sent in by Dr Moy (hematology) for low platelets, admit for ITP     Idiopathic thrombocytopenic purpura (ITP).   -  Plan: Patient presenting to the ED after being sent in by Dr Moy after he noticed some gingival bleeding, platelet count 0 on admission  - Patient with recent admission to [facility] from 9/14-9/18/21 for same issue, was treated with IVIG, steroids, and had a Rituxan infusion, and received 2 units of platelets prior to pt leaving AMA   - S/P 1 unit of platelets in ED, Platelet count is 1 this am   - Patient is currently on Promacta 50 mg po qd,  will continuing this medication while inpatient   continue  decadron 40 QD  - Patient is given benadryl 50mg IV push and Tylenol [id number] before transfusion.       Severe thrombocytopenia.   - Plan: Likely due to ITP  - as recommended above.  - Dr. Moy, following      Problem/Plan - 3:  ·  Problem: Need for prophylactic measure.   ·  Plan: Encourage ambulation  - No chemical prophylaxis due to risk of bleeding from severe thrombocytopenia.
[ASSESSMENT and  PLAN]    ITP, resistant/ refractory to  treatment of high dose Decadron 40mg x4d, and IVIG 80g daily x2.   Has temporary response only, after 2 rounds, with response ~ 1wk.   And then typically precipitous decline.   s/p Rituxan.     Initially presented /admitted 09/03/21, plt 0, with sx of gross hematuria/tongue hemorrhagic lesions/skin petechiae,   Treated then with 4 days Decadron 40mg and 2 days IVIG 80grams, w incr of plts to 49k on discharge 09/08/21    Seen in office Tue 09/14, but readmitted w plts 0 again and w hemorrhagic mucosal lesions and petechiae  Started on Solumedrol 40mg c6iotpe and post one unit plts  Then given will change steroid to IV Decadron 40mg x4 days, IVIG start today 1gm/kg=80gms x  2 days, in hope of again temp raise plts, in mean time will plan for ERx     Recently started Promacta for more definitive second line treatment    Likely also B12 deficiency. B12 levels 457, borderline.   Folate adequate.     +COVID Paulino IgG [09/15/21] but post IVIG tx.   neg COVID Paulino IgG [09/04/21] before IVIG treatment.   Has not had vaccine.       RECOMMENDATIONS  Continue IVIG D2/2  Continue Decadron D3/4    Can hold SDP today, unless sx.     Continue promacta 50mg daily.   Plan for Rituxan on Tues, this admission.   Nursing aware, staff avail on Tue.   Will contact pharmacy to coordinate obtaining medication    Transfuse additional SDP if pt symptomatic.     Follow [facility], lupus studies  Check COVID nc Ab    DVT Prophylaxis  OOB as tolerated.   High risk bleeding. Pharmacologic tx contraindicated.     Thank you for consulting us.     I have discussed the above plan of care with patient in detail. They expressed understanding of the treatment plan . Risks, benefits and alternatives discussed in detail. I have asked if they have any questions or concerns and appropriately addressed them; all questions answered to their satisfactions and in lay terms.     d/w medical team. 
24 y/o male with PMH of ITP presents to the ED after being sent in by Dr Moy (hematology) for low platelets, admitted for ITP.     Idiopathic thrombocytopenic purpura (ITP).   -  Plan: Patient presenting to the ED after being sent in by Dr Moy after he noticed some gingival bleeding, platelet count 0 on admission  - Patient with recent admission to [facility] from 9/14-9/18/21 for same issue, was treated with IVIG, steroids, and had a Rituxan infusion, and received 2 units of platelets prior to pt leaving AMA   - S/P 1 unit of platelets in ED, plt count 0 --> 1 --> 2 --> 5 --> 40 today  - Patient is given benadryl 50mg IV push and Tylenol [id number] before transfusion.  - Patient is currently on Promacta 50 mg po qd,  will continuing this medication while inpatient   - pt is s/p 2/2 days of IVIG (9/25-9/26)  - today 9/27 is day 4/4 of decadron 40mg IV, course completed  - Planned for Rituxan 375 mg/m2 on 9/28/21 as per Dr. Moy  - No indication for platelet transfusion today     Severe thrombocytopenia.   - Plan: Likely due to ITP  - as recommended above.  - Dr. Moy, following     Leukocytosis  - WBC 13.57k  - likely 2/2 steroids  - continue to monitor     Problem/Plan - 3:  ·  Problem: Need for prophylactic measure.   ·  Plan: Encourage ambulation  - No chemical prophylaxis due to risk of bleeding from severe thrombocytopenia.
26 y/o male with PMH of ITP presents to the ED after being sent in by Dr Moy (hematology) for low platelets, admit for ITP    # Problem/Plan - 1:  -  Problem: Idiopathic thrombocytopenic purpura (ITP).   -  Plan: Patient presenting to the ED after being sent in by Dr Moy after he noticed some gingival bleeding, platelet count 0 on admission  - Patient with recent admission to [facility] from 9/14-9/18/21 for same issue, was treated with IVIG, steroids, and had a Rituxan infusion, and received 2 units of platelets prior to pt leaving AMA   - S/P 1 unit of platelets in ED, Platelet count is 1 this am   - Patient is currently on Promacta 50 mg po qd,  will continuing this medication while inpatient   - Started decadron 40 QD  - Hematology (Dr. Moy) following. Will give 1 more unit of platelets this am, then IVIG and repeat cbc at 2pm today.  - Patient is given benadryl 50mg IV push and Tylenol [id number] before transfusion.  - Will Monitor platelets with daily CBC   - monitor for S&S bleeding  - neuro checks      Problem/Plan - 2:  - Problem: Severe thrombocytopenia.   - Plan: Likely due to ITP  - as recommended above.  - Dr. Moy, following      Problem/Plan - 3:  ·  Problem: Need for prophylactic measure.   ·  Plan: Encourage ambulation  - No chemical prophylaxis due to risk of bleeding from severe thrombocytopenia.
24 y/o man w resistant ITP, first presented Labor Day weekend, plts  as low as 0, very brief response (x3-4d) w IV Decadron , IVIG,  Rituxan, adm w 3rd episode, assoc w wet purpura of tongue. This time also started on Promacta as outpatient 9/23    Post pulse Decadron, 2 days of daily IVIG, plts incr to 40k yesterday and today only 49, no sig further incr  Will give Rituxan 825mg IV today, in hope of more sustained plt response, enable Promacta response  Discussed findings and plan w pt, staff, confirmed availability of the Rituxan amount w Pharmacy.  Pt may be discharged today after completion of Rituxan   Pt has office appt this Friday w me
[ASSESSMENT and  PLAN]  24 yo male with relapsed refractory ITP; initially treated on 9/3/21 with IVIG/steroids with improvement  initially but response was not sustained, started on Promacta 50mg po daily and received first dose of Rituxan last week    - completed IVIG 1g/kg=80grams X2 days on 9/25/21 and 9/26/21  - today 9/27/21 is day 4/4 of decadron 40mg daily   - continued on Promacta 50mg po daily  - Noted improvement of platelet count to 40K again but as with prior episodes, concern for acute drop  - Planned for Rituxan 375mg/m2 on 9/28/21 as per Dr. Moy; clearly this will be re-evaluated in a.m.  - No indication for transfusion today  - Discussed with Dr. Oropeza (hospitalist)  - will follow

## 2021-09-28 NOTE — PROGRESS NOTE ADULT - SUBJECTIVE AND OBJECTIVE BOX
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 non-distended, no hepatosplenomegaly, normal BS, no guarding.  Ext: no clubbing, no cyanosis, no edema.  Skin: no rashes,  no petechiae, no venous stasis changes.  Neuro: alert and oriented X 3 , no focal motor deficits.  LN: no SC JEET.    hard palate petechia less violaceous/less burgundy      [LABS:]                        12.1   8.38  )-----------( 5        ( 26 Sep 2021 10:15 )             36.5         136  |  104  |  18  ----------------------------<  104<H>  4.4   |  30  |  0.97    Ca    9.2      26 Sep 2021 10:15    TPro  8.7<H>  /  Alb  3.3  /  TBili  0.3  /  DBili  x   /  AST  39<H>  /  ALT  65  /  AlkPhos  51      PT/INR - ( 24 Sep 2021 21:34 )   PT: 12.6 sec;   INR: 1.08 ratio         PTT - ( 24 Sep 2021 21:34 )  PTT:28.2 sec      Vitamin B12, Serum: 457 pg/mL (21 @ 14:20)  Folate, Serum: 12.1 ng/mL (21 @ 14:20)  Folate, RBC: 914 ng/mL (21 @ 13:27)        COVID-19 PCR: NotDetec (24 Sep 2021 21:34)  COVID-19 PCR: NotDetec (13 Sep 2021 23:36)  COVID-19 PCR: NotDetec (03 Sep 2021 19:03)        [RADIOLOGY STUDIES:]

## 2022-02-24 NOTE — PROGRESS NOTE ADULT - PROBLEM SELECTOR PLAN 1
continue steroids  discussed with hematology attending - IVIG x 2 doses  Monitor platelet levels Keystone Flap Text: The defect edges were debeveled with a #15c scalpel blade.  Given the location of the defect, shape of the defect a keystone flap was deemed most appropriate.  Using a sterile surgical marker, an appropriate keystone flap was drawn incorporating the defect, outlining the appropriate donor tissue and placing the expected incisions within the relaxed skin tension lines where possible. The area thus outlined was incised deep to adipose tissue with a #15 scalpel blade.  The skin margins were undermined to an appropriate distance in all directions around the primary defect and laterally outward around the flap utilizing iris scissors.

## 2022-06-02 NOTE — ED PROVIDER NOTE - NS ED ROS FT

## 2022-09-20 NOTE — ED PROVIDER NOTE - CARDIAC, MLM
No risk alerts present Normal rate, regular rhythm.  Heart sounds S1, S2.  No murmurs, rubs or gallops.

## 2022-10-13 ENCOUNTER — EMERGENCY (EMERGENCY)
Facility: HOSPITAL | Age: 27
LOS: 1 days | Discharge: HOME | End: 2022-10-13
Attending: EMERGENCY MEDICINE | Admitting: EMERGENCY MEDICINE
Payer: COMMERCIAL

## 2022-10-13 VITALS
TEMPERATURE: 98 F | OXYGEN SATURATION: 99 % | RESPIRATION RATE: 18 BRPM | DIASTOLIC BLOOD PRESSURE: 80 MMHG | SYSTOLIC BLOOD PRESSURE: 110 MMHG

## 2022-10-13 VITALS
HEART RATE: 72 BPM | TEMPERATURE: 98 F | SYSTOLIC BLOOD PRESSURE: 111 MMHG | RESPIRATION RATE: 20 BRPM | WEIGHT: 205.03 LBS | HEIGHT: 73.5 IN | OXYGEN SATURATION: 99 % | DIASTOLIC BLOOD PRESSURE: 76 MMHG

## 2022-10-13 PROCEDURE — 99283 EMERGENCY DEPT VISIT LOW MDM: CPT

## 2022-10-13 PROCEDURE — 99284 EMERGENCY DEPT VISIT MOD MDM: CPT

## 2022-10-13 RX ORDER — IBUPROFEN 200 MG
600 TABLET ORAL ONCE
Refills: 0 | Status: COMPLETED | OUTPATIENT
Start: 2022-10-13 | End: 2022-10-13

## 2022-10-13 RX ORDER — CEPHALEXIN 500 MG
500 CAPSULE ORAL ONCE
Refills: 0 | Status: COMPLETED | OUTPATIENT
Start: 2022-10-13 | End: 2022-10-13

## 2022-10-13 RX ORDER — ELTROMBOPAG OLAMINE 50 MG/1
1 TABLET, FILM COATED ORAL
Qty: 0 | Refills: 0 | DISCHARGE

## 2022-10-13 RX ORDER — CEPHALEXIN 500 MG
1 CAPSULE ORAL
Qty: 15 | Refills: 0
Start: 2022-10-13 | End: 2022-10-17

## 2022-10-13 RX ADMIN — Medication 500 MILLIGRAM(S): at 14:00

## 2022-10-13 RX ADMIN — Medication 600 MILLIGRAM(S): at 14:02

## 2022-10-13 NOTE — ED PROVIDER NOTE - IV ALTEPLASE DOOR HIDDEN
show [FreeTextEntry1] : 70-year-old male with history of diet-controlled type 2 diabetes, hypertension, hyperlipidemia and obesity presents for his yearly physical.\par \par Patient's history also includes BPH with elevated PSA and has had biopsies in the past and follows with urologist . Recent evaluation with urology showed PSA increased. 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 no recent issues\par \par Otherwise generally is feeling fine without complaints including no chest pain, palpitations, shortness of breath or edema.\par \par The patient had progressive arthritis of his knees especially his left and had a left total knee replacement December 2018 with great success. Some issues with his right knee.\par \par The patient has not made any lifestyle changes with diet nor exercising for no weight loss.

## 2022-10-13 NOTE — ED PROVIDER NOTE - OBJECTIVE STATEMENT
Patient is a 26-year-old black male with history of ITP and recurrent gluteal abscesses who presents now to the emergency department at [facility] complaining of few days of worsening pain and swelling to the right superior gluteal area.  Patient states that any kind of pressure placed on that area causes pain.  No fever no chills no nausea no vomiting no diarrhea no discharge from site.  Patient admits to having an I&D of that area approximately 1 year ago.  Because of worsening pain patient decided to present here for further evaluation treatment and management.

## 2022-10-13 NOTE — CONSULT NOTE ADULT - SUBJECTIVE AND OBJECTIVE BOX
cc: pilonidal cyst tenderness    HPI:  26yoM w/ a pmhx of ITP and pilonidal cyst vs abscess? that was intervened on in the past. He states that he's felt this way 2 years ago when he went to the urgent care center and they lanced it with a "toothpick". He then went to a surgeon who further did a procedure which he's not 100% sure on. Today, he says the pressure and tenderness is similar to that of in the past. He is otherwise afebrile, tolerating a diet without nausea or vomiting, passing flatus and having soft stools/diarrhea.      PAST MEDICAL HISTORY:  Idiopathic thrombocytopenic purpura (ITP)        PAST SURGICAL HISTORY:  No significant past surgical history        ALLERGIES:  No Known Allergies      MEDICATIONS  (STANDING):  cephalexin 500 milliGRAM(s) Oral once    MEDICATIONS  (PRN):      VITALS & I/Os:  Vital Signs Last 24 Hrs  T(C): 36.7 (13 Oct 2022 12:01), Max: 36.7 (13 Oct 2022 12:01)  T(F): 98.1 (13 Oct 2022 12:01), Max: 98.1 (13 Oct 2022 12:01)  HR: 72 (13 Oct 2022 12:01) (72 - 72)  BP: 111/76 (13 Oct 2022 12:01) (111/76 - 111/76)  BP(mean): --  RR: 20 (13 Oct 2022 12:01) (20 - 20)  SpO2: 99% (13 Oct 2022 12:01) (99% - 99%)    Parameters below as of 13 Oct 2022 12:01  Patient On (Oxygen Delivery Method): room air      CAPILLARY BLOOD GLUCOSE          I&O's Summary        gen: nad, a&ox3  cv: rrr  resp: nonlabored breathing  gi: soft, nd, nttp  msk: no c/c/e  skin: small [id number] area of induration, no evidence of surrounding erythema. no punctate, no evidence of drainage.  possibility of old prior scar in  cleft?    LABS:        IMAGING:

## 2022-10-13 NOTE — ED PROVIDER NOTE - CHIEF COMPLAINT
DISPLAY PLAN FREE TEXT
The patient is a 26y Male complaining of gluteal pain
DISPLAY PLAN FREE TEXT

## 2022-10-13 NOTE — ED ADULT TRIAGE NOTE - CHIEF COMPLAINT QUOTE
I have a cyst (pilonidal) on my backside which I have had for a while now.  back some time, they lanced / drained it but it always returns.  it started up about 2 months ago, but went away, and now it has been pretty persistent over the last week.

## 2022-10-13 NOTE — CONSULT NOTE ADULT - ASSESSMENT
26yoM w/ pmhx of ITP and remote 2 year history of pilonidal cyst who presents with tenderness and fullness on the right side of the roseline cleft. Unknown history of whether pt had a cystectomy vs I&D of an infected pilonidal abscess. Currently, pt has been otherwise afebrile, no discharge from the cyst.  - recommend hot compresses 3-4x a day  - keflex for 5 days  - fu with dr. Guaman in 1-2 weeks

## 2022-10-13 NOTE — ED PROVIDER NOTE - DATE/TIME 2
Hatchet Flap Text: The defect edges were debeveled with a #15 scalpel blade.  Given the location of the defect, shape of the defect and the proximity to free margins a hatchet flap was deemed most appropriate.  Using a sterile surgical marker, an appropriate hatchet flap was drawn incorporating the defect and placing the expected incisions within the relaxed skin tension lines where possible.    The area thus outlined was incised deep to adipose tissue with a #15 scalpel blade.  The skin margins were undermined to an appropriate distance in all directions utilizing iris scissors. 13-Oct-2022 13:17

## 2022-10-13 NOTE — ED PROVIDER NOTE - NSFOLLOWUPINSTRUCTIONS_ED_ALL_ED_FT
Take Keflex 500 mg 1 tablet 3 times a day for the next 5 days.  Warm moist compress for the next 5 days.  Try to avoid pressure to affected area.  May appointment to follow-up with Dr. Zach Guaman next week.  Return here for any worsening pain fever chills or discharge from site.        [employer]® CareNotes®     :  [facility]  	                     ABSCESS - AfterCare(R) Instructions(ER/ED)           Abscess    WHAT YOU NEED TO KNOW:    A warm compress may help your abscess drain. Your healthcare provider may make a cut in the abscess so it can drain. You may need surgery to remove an abscess that is on your hands or buttocks.  Skin Abscess         DISCHARGE INSTRUCTIONS:    Return to the emergency department if:   •The area around your abscess becomes very painful, warm, or has red streaks.      •You have a fever and chills.      •Your heart is beating faster than usual.      •You feel faint or confused.      Call your doctor if:   •Your abscess gets bigger or does not get better.      •Your abscess returns.      •You have questions or concerns about your condition or care.      Medicines: You may need any of the following:  •Antibiotics help treat a bacterial infection.      •Acetaminophen decreases pain and fever. It is available without a doctor's order. Ask how much to take and how often to take it. Follow directions. Read the labels of all other medicines you are using to see if they also contain acetaminophen, or ask your doctor or pharmacist. Acetaminophen can cause liver damage if not taken correctly.      •NSAIDs, such as ibuprofen, help decrease swelling, pain, and fever. This medicine is available with or without a doctor's order. NSAIDs can cause stomach bleeding or kidney problems in certain people. If you take blood thinner medicine, always ask your healthcare provider if NSAIDs are safe for you. Always read the medicine label and follow directions.      •Take your medicine as directed. Contact your healthcare provider if you think your medicine is not helping or if you have side effects. Tell your provider if you are allergic to any medicine. Keep a list of the medicines, vitamins, and herbs you take. Include the amounts, and when and why you take them. Bring the list or the pill bottles to follow-up visits. Carry your medicine list with you in case of an emergency.      Self-care:   •Apply a warm compress to your abscess. This will help it open and drain. Wet a washcloth in warm, but not hot, water. Apply the compress for 10 minutes. Repeat this 4 times each day. Do not press on an abscess or try to open it with a needle. You may push the bacteria deeper or into your blood.      •Do not share your clothes, towels, or sheets with anyone. This can spread the infection to others.      •Wash your hands often. This can help prevent the spread of germs. Use soap and water or an alcohol-based hand rub.  Handwashing           Care for your wound after it is drained:   •Care for your wound as directed. If your healthcare provider says it is okay, carefully remove the bandage and gauze packing. You may need to soak the gauze to get it out of your wound. Clean your wound and the area around it as directed. Dry the area and put on new, clean bandages. Change your bandages when they get wet or dirty.      •Ask your healthcare provider how to change the gauze in your wound. Keep track of how many pieces of gauze are placed inside the wound. Do not put too much packing in the wound. Do not pack the gauze too tightly in your wound.      Follow up with your healthcare provider in 1 to 3 days: You may need to have your packing removed or your bandage changed. Write down your questions so you remember to ask them during your visits.       © Copyright [employer] 2022           back to top                          © Copyright [employer] 2022

## 2022-10-13 NOTE — ED PROVIDER NOTE - CARE PROVIDER_API CALL
Rodolfo Guaman)  Surgery  [address], [place] [id number]  Phone: [phone]  Fax: [phone]  Follow Up Time:

## 2022-10-13 NOTE — ED ADULT NURSE NOTE - OBJECTIVE STATEMENT
Pt states she has been having a pilonidal cyst for the past two years. Pt states that in the past 2 months it has been flaring up. Pt states that he had the cyst drained in the past. pt denies fever, chills, N/V,  s/s and SOB. Pt appears uncomfortable. Pt updated on plan of care.

## 2022-10-13 NOTE — ED PROVIDER NOTE - PATIENT PORTAL LINK FT
You can access the FollowMyHealth Patient Portal offered by [facility] by registering at the following website: [email or web address]. By joining [facility]’s FollowMyHealth portal, you will also be able to view your health information using other applications (apps) compatible with our system.

## 2022-10-13 NOTE — ED ADULT TRIAGE NOTE - AS TEMP SITE
Detail Level: Simple Consent: The patient's consent was obtained including but not limited to risks of crusting, scabbing, blistering, scarring, darker or lighter pigmentary change, recurrence, incomplete removal and infection. Render Post-Care Instructions In Note?: no forehead Post-Care Instructions: I reviewed with the patient in detail post-care instructions. Patient is to wear sunprotection, and avoid picking at any of the treated lesions. Pt may apply Vaseline to crusted or scabbing areas. Number Of Freeze-Thaw Cycles: 1 freeze-thaw cycle Duration Of Freeze Thaw-Cycle (Seconds): 1

## 2022-10-13 NOTE — ED PROVIDER NOTE - CLINICAL SUMMARY MEDICAL DECISION MAKING FREE TEXT BOX
Patient is a young black male with worsening right gluteal abscess requiring evaluation medications as well as surgical PA consultation.

## 2022-12-27 PROBLEM — Z00.00 ENCOUNTER FOR PREVENTIVE HEALTH EXAMINATION: Status: ACTIVE | Noted: 2022-12-27

## 2023-05-31 NOTE — ED PROVIDER NOTE - NSICDXPASTSURGICALHX_GEN_ALL_CORE_FT
Referrals placed to Home  Health for Skilled Nursing evaluation OT and PT referrals at daughter's request.  
PAST SURGICAL HISTORY:  No significant past surgical history     
good  throughout

## 2025-03-10 NOTE — ED ADULT TRIAGE NOTE - NS ED NURSE BANDS TYPE
Patient brought in by facility staff for SOB, cough x2 weeks and dizziness with standing  At baseline patient states he does not wear oxygen but does wear nocturnal CPAP  In ED required 4 L however had worsening respiratory status and was placed on BiPAP  Flu/Covid/RSV negative  CXR: Patchy lingular and lower lobe opacities, concerning for pneumonia.   CT CAP: Multifocal pneumonia involving the left lower lobe, lingula, and medial right lower lobe. Fluid in the esophagus suggests aspiration as a possible etiology. Moderate size hiatal hernia and distended fluid-filled esophagus to the level of the thoracic inlet.     Plan  Continue BiPAP nightly, currently on 2 L of oxygen through nasal cannula  Xopenex/Atrovent TID  Prednisone 40 mg daily continue for total of 5 days  Evaluated by speech, recommended regular diet with thin liquids  home oxygen evaluation prior to discharge   Name band;

## 2025-05-16 NOTE — H&P ADULT - ASSESSMENT
Precious Parmar  [address] [place] [facility] [address]    May 16, 2025     Dear Ms. Parmar:    Below are the results from your recent visit:    Resulted Orders   Measles / Mumps / Rubella Immunity   Result Value Ref Range    Rubella Antibodies, IgG 5.55 Immune >0.99 index      Comment:                                      Non-immune       <0.90                                  Equivocal  0.90 - 0.99                                  Immune           >0.99      Rubeola IgG 133.0 Immune >16.4 AU/mL      Comment:                                       Negative        <13.5                                   Equivocal 13.5 - 16.4                                   Positive        >16.4  Presence of antibodies to Rubeola is presumptive evidence  of immunity except when acute infection is suspected.      Mumps IgG 36.0 Immune >10.9 AU/mL      Comment:                                      Negative         <9.0                                  Equivocal  9.0 - 10.9                                  Positive        >10.9  A positive result generally indicates past exposure to  Mumps virus or previous vaccination.     Please Note   Result Value Ref Range    Please note Comment       Comment:      The date and/or time of collection was not indicated on the  requisition as required by state and federal law.  The date of  receipt of the specimen was used as the collection date if not  supplied.         You are immune to measles     If you have any questions or concerns, please don't hesitate to call.         Sincerely,        Wero Daley MD       25 year old M w no significant PMH admitted for acute ITP.

## 2025-07-23 NOTE — ED ADULT NURSE NOTE - CAS TRG GENERAL AIRWAY, MLM
To GI Scheduling Team-please call patient to move up EGD per Dr Langston's orders below. Thank you.       Noted below.  Patient notified via portal message routed to GI Scheduling team to contact patient and location option to complete Xray that has been ordered.    Bobbi Verdugo RN   Patent